# Patient Record
Sex: FEMALE | Race: BLACK OR AFRICAN AMERICAN | Employment: FULL TIME | URBAN - METROPOLITAN AREA
[De-identification: names, ages, dates, MRNs, and addresses within clinical notes are randomized per-mention and may not be internally consistent; named-entity substitution may affect disease eponyms.]

---

## 2018-10-16 ENCOUNTER — HOSPITAL ENCOUNTER (EMERGENCY)
Age: 33
Discharge: HOME | End: 2018-10-17
Attending: EMERGENCY MEDICINE
Payer: COMMERCIAL

## 2018-10-16 DIAGNOSIS — R20.0 NUMBNESS: Primary | ICD-10-CM

## 2018-10-16 DIAGNOSIS — G43.909 MIGRAINE WITHOUT STATUS MIGRAINOSUS, NOT INTRACTABLE, UNSPECIFIED MIGRAINE TYPE: ICD-10-CM

## 2018-10-16 DIAGNOSIS — H74.91 DISORDER OF RIGHT MASTOID: ICD-10-CM

## 2018-10-16 PROCEDURE — 93005 ELECTROCARDIOGRAM TRACING: CPT

## 2018-10-16 PROCEDURE — 85025 COMPLETE CBC W/AUTO DIFF WBC: CPT | Performed by: EMERGENCY MEDICINE

## 2018-10-16 PROCEDURE — 83735 ASSAY OF MAGNESIUM: CPT | Performed by: EMERGENCY MEDICINE

## 2018-10-16 PROCEDURE — 99285 EMERGENCY DEPT VISIT HI MDM: CPT

## 2018-10-16 PROCEDURE — 82550 ASSAY OF CK (CPK): CPT | Performed by: EMERGENCY MEDICINE

## 2018-10-16 PROCEDURE — 80048 BASIC METABOLIC PNL TOTAL CA: CPT | Performed by: EMERGENCY MEDICINE

## 2018-10-17 ENCOUNTER — APPOINTMENT (OUTPATIENT)
Dept: GENERAL RADIOLOGY | Age: 33
End: 2018-10-17
Attending: EMERGENCY MEDICINE
Payer: COMMERCIAL

## 2018-10-17 ENCOUNTER — APPOINTMENT (OUTPATIENT)
Dept: CT IMAGING | Age: 33
End: 2018-10-17
Attending: EMERGENCY MEDICINE
Payer: COMMERCIAL

## 2018-10-17 VITALS
HEART RATE: 79 BPM | DIASTOLIC BLOOD PRESSURE: 72 MMHG | BODY MASS INDEX: 43.23 KG/M2 | WEIGHT: 269 LBS | RESPIRATION RATE: 20 BRPM | HEIGHT: 66 IN | OXYGEN SATURATION: 98 % | SYSTOLIC BLOOD PRESSURE: 147 MMHG

## 2018-10-17 LAB
ANION GAP SERPL CALC-SCNC: 9 MMOL/L (ref 3–18)
BASOPHILS # BLD: 0 K/UL (ref 0–0.1)
BASOPHILS NFR BLD: 0 % (ref 0–3)
BUN SERPL-MCNC: 7 MG/DL (ref 7–18)
BUN/CREAT SERPL: 7 (ref 12–20)
CALCIUM SERPL-MCNC: 8.9 MG/DL (ref 8.5–10.1)
CHLORIDE SERPL-SCNC: 104 MMOL/L (ref 100–108)
CK MB CFR SERPL CALC: 0.2 % (ref 0–4)
CK MB SERPL-MCNC: 1.5 NG/ML (ref 5–25)
CK SERPL-CCNC: 680 U/L (ref 26–192)
CO2 SERPL-SCNC: 25 MMOL/L (ref 21–32)
CREAT SERPL-MCNC: 1.02 MG/DL (ref 0.6–1.3)
DIFFERENTIAL METHOD BLD: ABNORMAL
EOSINOPHIL # BLD: 0 K/UL (ref 0–0.4)
EOSINOPHIL NFR BLD: 0 % (ref 0–5)
ERYTHROCYTE [DISTWIDTH] IN BLOOD BY AUTOMATED COUNT: 12.1 % (ref 11.6–14.5)
GLUCOSE SERPL-MCNC: 97 MG/DL (ref 74–99)
HCT VFR BLD AUTO: 35.6 % (ref 35–45)
HGB BLD-MCNC: 12.2 G/DL (ref 12–16)
LYMPHOCYTES # BLD: 6.7 K/UL (ref 0.8–3.5)
LYMPHOCYTES NFR BLD: 45 % (ref 20–51)
MAGNESIUM SERPL-MCNC: 1.7 MG/DL (ref 1.6–2.6)
MCH RBC QN AUTO: 29 PG (ref 24–34)
MCHC RBC AUTO-ENTMCNC: 34.3 G/DL (ref 31–37)
MCV RBC AUTO: 84.8 FL (ref 74–97)
MONOCYTES # BLD: 1.6 K/UL (ref 0–1)
MONOCYTES NFR BLD: 11 % (ref 2–9)
NEUTS SEG # BLD: 6.5 K/UL (ref 1.8–8)
NEUTS SEG NFR BLD: 44 % (ref 42–75)
PLATELET # BLD AUTO: 371 K/UL (ref 135–420)
PMV BLD AUTO: 9 FL (ref 9.2–11.8)
POTASSIUM SERPL-SCNC: 3.3 MMOL/L (ref 3.5–5.5)
RBC # BLD AUTO: 4.2 M/UL (ref 4.2–5.3)
RBC MORPH BLD: ABNORMAL
SODIUM SERPL-SCNC: 138 MMOL/L (ref 136–145)
TROPONIN I SERPL-MCNC: <0.02 NG/ML (ref 0–0.06)
WBC # BLD AUTO: 14.8 K/UL (ref 4.6–13.2)
WBC MORPH BLD: ABNORMAL

## 2018-10-17 PROCEDURE — 70450 CT HEAD/BRAIN W/O DYE: CPT

## 2018-10-17 PROCEDURE — 72125 CT NECK SPINE W/O DYE: CPT

## 2018-10-17 PROCEDURE — 71046 X-RAY EXAM CHEST 2 VIEWS: CPT

## 2018-10-17 NOTE — ED TRIAGE NOTES
Presented to ED to be evaluated for reported 24 hour left foot numbness. Patient also reports numbness to left upper extremity with onset this p.m. Patient reports pain as documented. Patient states denies any additional complaints at time of triage. Patient is A&Ox4 with no s/s distress noted. Sepsis Screening completed (  )Patient meets SIRS criteria. ( x )Patient does not meet SIRS criteria. SIRS Criteria is achieved when two or more of the following are present ? Temperature < 96.8°F (36°C) or > 100.9°F (38.3°C) ? Heart Rate > 90 beats per minute ? Respiratory Rate > 20 breaths per minute ? WBC count > 12,000 or <4,000 or > 10% bands

## 2018-10-17 NOTE — ED NOTES
The documentation for this period is being entered following the guidelines as defined in the [address] [place] [address] downtime policy by Jhonny Morales RN.

## 2018-10-17 NOTE — ED PROVIDER NOTES
EMERGENCY DEPARTMENT HISTORY AND PHYSICAL EXAM 
 
Date: 10/16/2018 Patient Name: Karis Abbasi History of Presenting Illness Chief Complaint Patient presents with  Numbness History Provided By: Patient Chief Complaint: Left sided numbness Duration: 1 day Timing:  Constant Location: Left foot, left arm, left face Quality: Numb Severity: 7 out of 10 Associated Symptoms: Intermittent left arm pain Additional History (Context):  
12:33 AM 
Karis Abbasi is a 28 y.o. female who presents to the emergency department C/O left sided numbness beginning from her left foot up her leg into her left arm and left face (rated 7/10), onset yesterday. Associated sxs include intermittent left arm pain. Notes similar sxs in the past associated with dizziness. Pt is right hand dominant. Of note, pt has been seen by rheumatology to screen for autoimmune disease, last seen 1 year ago. Notes FHx of DM in mother, migraines in both parents, and HTN in multiple family members. Does not endorse tobacco, EtOH, or illicit drug use. Pt denies fever, visual changes, sinus pressure, rhinorrhea, aphasia, FHx of lupus or other autoimmune disorder, or any other sxs or complaints. PCP: Katelyn Hammonds MD 
 
 
 
Past History Past Medical History: 
History reviewed. No pertinent past medical history. Past Surgical History: 
Past Surgical History:  
Procedure Laterality Date  HX CHOLECYSTECTOMY    
 2009 Family History: 
History reviewed. No pertinent family history. Social History: 
Social History Tobacco Use  Smoking status: Never Smoker  Smokeless tobacco: Never Used Substance Use Topics  Alcohol use: No  
 Drug use: No  
 
 
Allergies: Allergies Allergen Reactions  Amoxicillin Rash Review of Systems Review of Systems Constitutional: Negative for chills, diaphoresis, fever and unexpected weight change. HENT: Negative for congestion, drooling, ear pain, rhinorrhea, sore throat, tinnitus and trouble swallowing. Eyes: Negative for photophobia, pain, redness and visual disturbance. Respiratory: Negative for cough, choking, chest tightness, shortness of breath, wheezing and stridor. Cardiovascular: Negative for chest pain, palpitations and leg swelling. Gastrointestinal: Negative for abdominal distention, abdominal pain, anal bleeding, blood in stool, constipation, diarrhea, nausea and vomiting. Genitourinary: Negative for difficulty urinating, dysuria, flank pain, frequency, hematuria and urgency. Musculoskeletal: Positive for myalgias (intermittent left arm pain). Negative for arthralgias, back pain and neck pain. Skin: Negative for color change, rash and wound. Neurological: Positive for numbness (Left foot, left arm, left face). Negative for dizziness, seizures, syncope, speech difficulty, light-headedness and headaches. Hematological: Does not bruise/bleed easily. Psychiatric/Behavioral: Negative for agitation, behavioral problems, hallucinations, self-injury and suicidal ideas. The patient is not hyperactive. Physical Exam  
 
Vitals:  
 10/17/18 0300 10/17/18 0330 10/17/18 0400 10/17/18 0430 BP: 151/75 148/69 157/78 147/72 Pulse: 82 80 92 79 Resp: 16 22 23 20 SpO2: 99% 99% 99% 98% Weight:      
Height:      
 
Physical Exam  
Constitutional: She is oriented to person, place, and time. She appears well-developed and well-nourished. No distress. HENT:  
Head: Normocephalic and atraumatic. Right Ear: External ear normal.  
Left Ear: External ear normal.  
Mouth/Throat: Oropharynx is clear and moist. No oropharyngeal exudate. Eyes: Conjunctivae and EOM are normal. Pupils are equal, round, and reactive to light. No scleral icterus. No pallor Neck: Normal range of motion. Neck supple. No JVD present. No tracheal deviation present. No thyromegaly present. Cardiovascular: Normal rate, regular rhythm and normal heart sounds. Pulmonary/Chest: Effort normal and breath sounds normal. No stridor. No respiratory distress. Abdominal: Soft. Bowel sounds are normal. She exhibits no distension. There is no tenderness. There is no rebound and no guarding. Musculoskeletal: Normal range of motion. She exhibits no edema or tenderness. No soft tissue injuries Lymphadenopathy:  
  She has no cervical adenopathy. Neurological: She is alert and oriented to person, place, and time. She has normal strength and normal reflexes. She displays no atrophy and no tremor. No cranial nerve deficit or sensory deficit. She exhibits normal muscle tone. She displays no seizure activity. Coordination and gait normal. GCS eye subscore is 4. GCS verbal subscore is 5. GCS motor subscore is 6. Reflex Scores: 
     Brachioradialis reflexes are 2+ on the right side and 2+ on the left side. Patellar reflexes are 2+ on the right side and 2+ on the left side. Achilles reflexes are 2+ on the right side and 2+ on the left side. radial median and ulnar nerve function is normal, normal FTN function. No drift, normal cognition and normal fine motor coordination Skin: Skin is warm and dry. No rash noted. She is not diaphoretic. No erythema. Psychiatric: She has a normal mood and affect. Her behavior is normal. Judgment and thought content normal. Her speech is not delayed and not slurred. Cognition and memory are not impaired. She exhibits normal recent memory and normal remote memory. Nursing note and vitals reviewed. Diagnostic Study Results Labs - No results found for this or any previous visit (from the past 12 hour(s)). Radiologic Studies -   
CT Results  (Last 48 hours) None CXR Results  (Last 48 hours) None Medical Decision Making I am the first provider for this patient. I reviewed the vital signs, available nursing notes, past medical history, past surgical history, family history and social history. Vital Signs-Reviewed the patient's vital signs. Pulse Oximetry Analysis - 100% on RA Cardiac Monitor: 
Rate: 83 bpm 
Rhythm: NSR 
 
EKG interpretation: (Preliminary) 11:13 PM 
NSR at 87 bpm. Normal ST segments. EKG read by Dianne Cabello. Abril Zuniga MD at 11:17 PM  
 
Records Reviewed: Nursing Notes and Old Medical Records Provider Notes (Medical Decision Making):  
Ddx: Unlikely to be CVA, tumor, or bleed, but possible. Will scan including C-spine for neurologic compromises, otherwise migraine. Procedures: 
Procedures MEDICATIONS GIVEN: 
Medications - No data to display ED Course: The documentation for this period is being entered following the guidelines as defined in the [facility] policy by Rusty Bryant and Dianne Cabello. Abril Zuniga MD. 
 
12:33 AM  
Initial assessment performed. The patients presenting problems have been discussed, and they are in agreement with the care plan formulated and outlined with them. I have encouraged them to ask questions as they arise throughout their visit. Diagnosis and Disposition DISCHARGE NOTE: 
4:10 AM 
Bobby Pantoja  results have been reviewed with her. She has been counseled regarding her diagnosis, treatment, and plan. She verbally conveys understanding and agreement of the signs, symptoms, diagnosis, treatment and prognosis and additionally agrees to follow up as discussed. She also agrees with the care-plan and conveys that all of her questions have been answered. I have also provided discharge instructions for her that include: educational information regarding their diagnosis and treatment, and list of reasons why they would want to return to the ED prior to their follow-up appointment, should her condition change. She has been provided with education for proper emergency department utilization. CLINICAL IMPRESSION: 
 
1. Numbness 2. Migraine without status migrainosus, not intractable, unspecified migraine type 3. Disorder of right mastoid Downtime instructions given. PLAN: 
1. D/C Home 2. There are no discharge medications for this patient. 3.  
Follow-up Information Follow up With Specialties Details Why Contact Info Stefania Thompson MD Family Practice Schedule an appointment as soon as possible for a visit in 2 days For primary care follow up [id number] [facility] 
[phone] [facility] EMERGENCY DEPT Emergency Medicine  As needed, If symptoms worsen [address] [place] [id number] 
[phone]  
  
 
_______________________________ Attestations: This note is prepared by Adams Reid, acting as Scribe for Cliff Vasquez. Harmony Byers MD. Cliff Vasquez. Harmony Byers MD:  The scribe's documentation has been prepared under my direction and personally reviewed by me in its entirety. I confirm that the note above accurately reflects all work, treatment, procedures, and medical decision making performed by me. 
_______________________________

## 2018-10-17 NOTE — ED NOTES
Patient given copy of dc instructions and 1 script(s) for Fioricet. Patient verbalized understanding of instructions and script (s). Patient given a current medication reconciliation form and verbalized understanding of their medications. Patient verbalized understanding of the importance of discussing medications with  his or her physician or clinic they will be following up with. Patient alert and oriented and in no acute distress. Patient discharged home ambulatory with self.

## 2018-11-25 ENCOUNTER — APPOINTMENT (OUTPATIENT)
Dept: ULTRASOUND IMAGING | Age: 33
End: 2018-11-25
Attending: EMERGENCY MEDICINE
Payer: COMMERCIAL

## 2018-11-25 ENCOUNTER — HOSPITAL ENCOUNTER (EMERGENCY)
Age: 33
Discharge: HOME | End: 2018-11-25
Attending: EMERGENCY MEDICINE | Admitting: EMERGENCY MEDICINE
Payer: COMMERCIAL

## 2018-11-25 VITALS
HEIGHT: 66 IN | RESPIRATION RATE: 14 BRPM | HEART RATE: 84 BPM | WEIGHT: 271.17 LBS | DIASTOLIC BLOOD PRESSURE: 74 MMHG | BODY MASS INDEX: 43.58 KG/M2 | OXYGEN SATURATION: 100 % | SYSTOLIC BLOOD PRESSURE: 133 MMHG | TEMPERATURE: 98.1 F

## 2018-11-25 DIAGNOSIS — R10.2 PELVIC PAIN AFFECTING PREGNANCY IN FIRST TRIMESTER, ANTEPARTUM: Primary | ICD-10-CM

## 2018-11-25 DIAGNOSIS — O26.891 PELVIC PAIN AFFECTING PREGNANCY IN FIRST TRIMESTER, ANTEPARTUM: Primary | ICD-10-CM

## 2018-11-25 DIAGNOSIS — Z3A.01 6 WEEKS GESTATION OF PREGNANCY: ICD-10-CM

## 2018-11-25 LAB
ABO + RH BLD: NORMAL
ALBUMIN SERPL-MCNC: 3.6 G/DL (ref 3.4–5)
ALBUMIN/GLOB SERPL: 0.7 {RATIO} (ref 0.8–1.7)
ALP SERPL-CCNC: 68 U/L (ref 45–117)
ALT SERPL-CCNC: 21 U/L (ref 13–56)
ANION GAP SERPL CALC-SCNC: 10 MMOL/L (ref 3–18)
APPEARANCE UR: CLEAR
AST SERPL-CCNC: 20 U/L (ref 15–37)
BASOPHILS # BLD: 0.1 K/UL (ref 0–0.1)
BASOPHILS NFR BLD: 1 % (ref 0–3)
BILIRUB SERPL-MCNC: 0.4 MG/DL (ref 0.2–1)
BILIRUB UR QL: NEGATIVE
BUN SERPL-MCNC: 7 MG/DL (ref 7–18)
BUN/CREAT SERPL: 8
CALCIUM SERPL-MCNC: 9.2 MG/DL (ref 8.5–10.1)
CHLORIDE SERPL-SCNC: 101 MMOL/L (ref 100–108)
CO2 SERPL-SCNC: 25 MMOL/L (ref 21–32)
COLOR UR: YELLOW
CREAT SERPL-MCNC: 0.91 MG/DL (ref 0.6–1.3)
DIFFERENTIAL METHOD BLD: ABNORMAL
EOSINOPHIL # BLD: 0.1 K/UL (ref 0–0.4)
EOSINOPHIL NFR BLD: 1 % (ref 0–5)
ERYTHROCYTE [DISTWIDTH] IN BLOOD BY AUTOMATED COUNT: 12.4 % (ref 11.6–14.5)
GLOBULIN SER CALC-MCNC: 4.9 G/DL (ref 2–4)
GLUCOSE SERPL-MCNC: 78 MG/DL (ref 74–99)
GLUCOSE UR STRIP.AUTO-MCNC: NEGATIVE MG/DL
HCG SERPL-ACNC: 8966 MIU/ML (ref 0–10)
HCG UR QL: POSITIVE
HCT VFR BLD AUTO: 37.7 % (ref 35–45)
HGB BLD-MCNC: 13.1 G/DL (ref 12–16)
HGB UR QL STRIP: NEGATIVE
KETONES UR QL STRIP.AUTO: NEGATIVE MG/DL
LEUKOCYTE ESTERASE UR QL STRIP.AUTO: NEGATIVE
LIPASE SERPL-CCNC: 185 U/L (ref 73–393)
LYMPHOCYTES # BLD: 5 K/UL (ref 0.8–3.5)
LYMPHOCYTES NFR BLD: 42 % (ref 20–51)
MCH RBC QN AUTO: 29.1 PG (ref 24–34)
MCHC RBC AUTO-ENTMCNC: 34.7 G/DL (ref 31–37)
MCV RBC AUTO: 83.8 FL (ref 74–97)
MONOCYTES # BLD: 1.2 K/UL (ref 0–1)
MONOCYTES NFR BLD: 10 % (ref 2–9)
NEUTS SEG # BLD: 5.5 K/UL (ref 1.8–8)
NEUTS SEG NFR BLD: 46 % (ref 42–75)
NITRITE UR QL STRIP.AUTO: NEGATIVE
PH UR STRIP: 5.5 [PH] (ref 5–8)
PLATELET # BLD AUTO: 351 K/UL (ref 135–420)
PMV BLD AUTO: 8.4 FL (ref 9.2–11.8)
POTASSIUM SERPL-SCNC: 3.7 MMOL/L (ref 3.5–5.5)
PROT SERPL-MCNC: 8.5 G/DL (ref 6.4–8.2)
PROT UR STRIP-MCNC: NEGATIVE MG/DL
RBC # BLD AUTO: 4.5 M/UL (ref 4.2–5.3)
RBC MORPH BLD: ABNORMAL
SERVICE CMNT-IMP: NORMAL
SODIUM SERPL-SCNC: 136 MMOL/L (ref 136–145)
SP GR UR REFRACTOMETRY: 1.01 (ref 1–1.03)
UROBILINOGEN UR QL STRIP.AUTO: 0.2 EU/DL (ref 0.2–1)
WBC # BLD AUTO: 11.9 K/UL (ref 4.6–13.2)
WBC MORPH BLD: ABNORMAL
WET PREP GENITAL: NORMAL

## 2018-11-25 PROCEDURE — 99285 EMERGENCY DEPT VISIT HI MDM: CPT

## 2018-11-25 PROCEDURE — 84702 CHORIONIC GONADOTROPIN TEST: CPT

## 2018-11-25 PROCEDURE — 87491 CHLMYD TRACH DNA AMP PROBE: CPT

## 2018-11-25 PROCEDURE — 80053 COMPREHEN METABOLIC PANEL: CPT

## 2018-11-25 PROCEDURE — 86900 BLOOD TYPING SEROLOGIC ABO: CPT

## 2018-11-25 PROCEDURE — 83690 ASSAY OF LIPASE: CPT

## 2018-11-25 PROCEDURE — 85025 COMPLETE CBC W/AUTO DIFF WBC: CPT

## 2018-11-25 PROCEDURE — 93976 VASCULAR STUDY: CPT

## 2018-11-25 PROCEDURE — 81025 URINE PREGNANCY TEST: CPT

## 2018-11-25 PROCEDURE — 87210 SMEAR WET MOUNT SALINE/INK: CPT

## 2018-11-25 PROCEDURE — 81003 URINALYSIS AUTO W/O SCOPE: CPT

## 2018-11-25 RX ORDER — ACETAMINOPHEN 325 MG/1
650 TABLET ORAL
Status: DISCONTINUED | OUTPATIENT
Start: 2018-11-25 | End: 2018-11-25

## 2018-11-25 NOTE — DISCHARGE INSTRUCTIONS
Pelvic Pain: Care Instructions  Your Care Instructions    Pelvic pain, or pain in the lower belly, can have many causes. Often pelvic pain is not serious and gets better in a few days. If your pain continues or gets worse, you may need tests and treatment. Tell your doctor about any new symptoms. These may be signs of a serious problem. Follow-up care is a key part of your treatment and safety. Be sure to make and go to all appointments, and call your doctor if you are having problems. It's also a good idea to know your test results and keep a list of the medicines you take. How can you care for yourself at home? · Rest until you feel better. Lie down, and raise your legs by placing a pillow under your knees. · Drink plenty of fluids. You may find that small, frequent sips are easier on your stomach than if you drink a lot at once. Avoid drinks with carbonation or caffeine, such as soda pop, tea, or coffee. · Try eating several small meals instead of 2 or 3 large ones. Eat mild foods, such as rice, dry toast or crackers, bananas, and applesauce. Avoid fatty and spicy foods, other fruits, and alcohol until 48 hours after your symptoms have gone away. · Take an over-the-counter pain medicine, such as acetaminophen (Tylenol), ibuprofen (Advil, Motrin), or naproxen (Aleve). Read and follow all instructions on the label. · Do not take two or more pain medicines at the same time unless the doctor told you to. Many pain medicines have acetaminophen, which is Tylenol. Too much acetaminophen (Tylenol) can be harmful. · You can put a heating pad, a warm cloth, or moist heat on your belly to relieve pain. When should you call for help?   Call your doctor now or seek immediate medical care if:    · You have a new or higher fever.     · You have unusual vaginal bleeding.     · You have new or worse belly or pelvic pain.     · You have vaginal discharge that has increased in amount or smells bad.    Watch closely for changes in your health, and be sure to contact your doctor if:    · You do not get better as expected. Where can you learn more? Go to http://milan-germain.info/. Enter [id number] in the search box to learn more about \"Pelvic Pain: Care Instructions. \"  Current as of: May 15, 2018  Content Version: 11.8  © 1281-3042 [employer]. Care instructions adapted under license by [employer] (which disclaims liability or warranty for this information). If you have questions about a medical condition or this instruction, always ask your healthcare professional. [address] any warranty or liability for your use of this information. Weeks 6 to 10 of Your Pregnancy: Care Instructions  Your Care Instructions    Congratulations on your pregnancy. This is an exciting and important time for you. During the first 6 to 10 weeks of your pregnancy, your body goes through many changes. Your baby grows very fast, even though you cannot feel it yet. You may start to notice that you feel different, both in your body and your emotions. Because each woman's pregnancy is unique, there is no right way to feel. You may feel the healthiest you have ever been, or you may feel tired or sick to your stomach (\"morning sickness\"). These early weeks are a time to make healthy choices and to eat the best foods for you and your baby. This care sheet will give you some ideas. This is also a good time to think about birth defects testing. These are tests done during pregnancy to look for possible problems with the baby. First trimester tests for birth defects can be done between 8 and 17 weeks of pregnancy, depending on the test. Talk with your doctor about what kinds of tests are available. Follow-up care is a key part of your treatment and safety. Be sure to make and go to all appointments, and call your doctor if you are having problems.  It's also a good idea to know your test results and keep a list of the medicines you take. How can you care for yourself at home? Eat well  · Eat at least 3 meals and 2 healthy snacks every day. Eat fresh, whole foods, including:  ? 7 or more servings of bread, tortillas, cereal, rice, pasta, or oatmeal.  ? 3 or more servings of vegetables, especially leafy green vegetables. ? 2 or more servings of fruits. ? 3 or more servings of milk, yogurt, or cheese. ? 2 or more servings of meat, turkey, chicken, fish, eggs, or dried beans. · Drink plenty of fluids, especially water. Avoid sodas and other sweetened drinks. · Choose foods that have important vitamins for your baby, such as calcium, iron, and folate. ? Dairy products, tofu, canned fish with bones, almonds, broccoli, dark leafy greens, corn tortillas, and fortified orange juice are good sources of calcium. ? Beef, poultry, liver, spinach, lentils, dried beans, fortified cereals, and dried fruits are rich in iron. ? Dark leafy greens, broccoli, asparagus, liver, fortified cereals, orange juice, peanuts, and almonds are good sources of folate. · Avoid foods that could harm your baby. ? Do not eat raw or undercooked meat, chicken, or fish (such as sushi or raw oysters). ? Do not eat raw eggs or foods that contain raw eggs, such as Caesar dressing. ? Do not eat soft cheeses and unpasteurized dairy foods, such as Brie, feta, or blue cheese. ? Do not eat fish that contains a lot of mercury, such as shark, swordfish, tilefish, or gardenia mackerel. Do not eat more than 6 ounces of tuna each week. ? Do not eat raw sprouts, especially alfalfa sprouts. ? Cut down on caffeine, such as coffee, tea, and cola. Protect yourself and your baby  · Do not touch chetan litter or cat feces. They can cause an infection that could harm your baby. · High body temperature can be harmful to your baby. So if you want to use a sauna or hot tub, be sure to talk to your doctor about how to use it safely.   [place] with morning sickness  · Sip small amounts of water, juices, or shakes. Try drinking between meals, not with meals. · Eat 5 or 6 small meals a day. Try dry toast or crackers when you first get up, and eat breakfast a little later. · Avoid spicy, greasy, and fatty foods. · When you feel sick, open your windows or go for a short walk to get fresh air. · Try nausea wristbands. These help some women. · Tell your doctor if you think your prenatal vitamins make you sick. Where can you learn more? Go to http://milan-germain.info/. Enter G112 in the search box to learn more about \"Weeks 6 to 10 of Your Pregnancy: Care Instructions. \"  Current as of: November 21, 2017  Content Version: 11.8  © 1836-9271 [employer]. Care instructions adapted under license by [employer] (which disclaims liability or warranty for this information). If you have questions about a medical condition or this instruction, always ask your healthcare professional. [address] any warranty or liability for your use of this information.

## 2018-11-25 NOTE — ED PROVIDER NOTES
EMERGENCY DEPARTMENT HISTORY AND PHYSICAL EXAM 
 
Date: 11/25/2018 Patient Name: Manda Baptiste History of Presenting Illness Chief Complaint Patient presents with  Abdominal Pain History Provided By: Patient Chief Complaint: Pelvic pain Duration: 2-3 Days Timing:  Acute and Worsening Location: Pelvis Quality: Cramping Severity: 5 out of 10 Associated Symptoms: nausea Additional History (Context):  
8:21 AM  
Manda Baptiste is a 28 y.o. female with no PMHx who presents to the emergency department C/O cramping pelvic pain (5/10) worse on the right onset 2-3 days ago that worsened last night. Associated sxs include nausea. Pt reports one positive at home pregnancy test last night. LNMP was October 17th. Reports allergy to Amoxicillin. Pt denies vomiting, dysuria, back pain, vaginal discharge, vaginal bleeding, and any other sxs or complaints. PCP: Yong, MD Norman 
 
 
 
Past History Past Medical History: 
History reviewed. No pertinent past medical history. Past Surgical History: 
Past Surgical History:  
Procedure Laterality Date  HX CHOLECYSTECTOMY    
 2009 Family History: 
History reviewed. No pertinent family history. Social History: 
Social History Tobacco Use  Smoking status: Never Smoker  Smokeless tobacco: Never Used Substance Use Topics  Alcohol use: No  
 Drug use: No  
 
 
Allergies: Allergies Allergen Reactions  Amoxicillin Rash Review of Systems Review of Systems Gastrointestinal: Positive for nausea. Negative for vomiting. Genitourinary: Positive for pelvic pain. Negative for dysuria, vaginal bleeding and vaginal discharge. Musculoskeletal: Negative for back pain. All other systems reviewed and are negative. Physical Exam  
 
Vitals:  
 11/25/18 0810 11/25/18 1108 BP: 161/78 133/74 Pulse: 84 Resp: 14 14 Temp: 98.1 °F (36.7 °C) SpO2: 100% 100% Weight: 123 kg (271 lb 2.7 oz) Height: 5' 6\" (1.676 m) Physical Exam  
Nursing note and vitals reviewed. Vital signs and nursing notes reviewed CONSTITUTIONAL: Alert, in no apparent distress; well-developed; well-nourished. HEAD:  Normocephalic, atraumatic EYES: PERRL; EOM's intact. ENTM: Nose: no rhinorrhea; Throat: no erythema or exudate, mucous membranes moist 
Neck:  No JVD, supple without lymphadenopathy RESP: Chest clear, equal breath sounds. CV: S1 and S2 WNL; No murmurs, gallops or rubs. GI: Normal bowel sounds, abdomen soft and non-tender. No masses or organomegaly. : No costo-vertebral angle tenderness. Mild right sided pelvic pain. BACK:  Non-tender UPPER EXT:  Normal inspection LOWER EXT: No edema, no calf tenderness. Distal pulses intact. NEURO: CN intact, reflexes 2/4 and sym, strength 5/5 and sym, sensation intact. SKIN: No rashes; Normal for age and stage. PSYCH:  Alert and oriented, normal affect. Diagnostic Study Results Labs - Recent Results (from the past 12 hour(s)) URINALYSIS W/ RFLX MICROSCOPIC Collection Time: 11/25/18  8:15 AM  
Result Value Ref Range Color YELLOW Appearance CLEAR Specific gravity 1.011 1.005 - 1.030    
 pH (UA) 5.5 5.0 - 8.0 Protein NEGATIVE  NEG mg/dL Glucose NEGATIVE  NEG mg/dL Ketone NEGATIVE  NEG mg/dL Bilirubin NEGATIVE  NEG Blood NEGATIVE  NEG Urobilinogen 0.2 0.2 - 1.0 EU/dL Nitrites NEGATIVE  NEG Leukocyte Esterase NEGATIVE  NEG    
HCG URINE, QL. - POC Collection Time: 11/25/18  8:17 AM  
Result Value Ref Range Pregnancy test,urine (POC) POSITIVE (A) NEG    
BLOOD TYPE, (ABO+RH) Collection Time: 11/25/18  8:30 AM  
Result Value Ref Range ABO/Rh(D) O POSITIVE   
CBC WITH AUTOMATED DIFF Collection Time: 11/25/18  8:40 AM  
Result Value Ref Range WBC 11.9 4.6 - 13.2 K/uL  
 RBC 4.50 4. 20 - 5.30 M/uL  
 HGB 13.1 12.0 - 16.0 g/dL HCT 37.7 35.0 - 45.0 %  MCV 83.8 74.0 - 97.0 FL  
 MCH 29.1 24.0 - 34.0 PG  
 MCHC 34.7 31.0 - 37.0 g/dL  
 RDW 12.4 11.6 - 14.5 % PLATELET 067 006 - 595 K/uL MPV 8.4 (L) 9.2 - 11.8 FL  
 NEUTROPHILS 46 42 - 75 % LYMPHOCYTES 42 20 - 51 % MONOCYTES 10 (H) 2 - 9 % EOSINOPHILS 1 0 - 5 % BASOPHILS 1 0 - 3 %  
 ABS. NEUTROPHILS 5.5 1.8 - 8.0 K/UL  
 ABS. LYMPHOCYTES 5.0 (H) 0.8 - 3.5 K/UL  
 ABS. MONOCYTES 1.2 (H) 0 - 1.0 K/UL  
 ABS. EOSINOPHILS 0.1 0.0 - 0.4 K/UL  
 ABS. BASOPHILS 0.1 0.0 - 0.1 K/UL  
 RBC COMMENTS TARGET CELLS 
OCCASIONAL 
    
 WBC COMMENTS ATYPICAL LYMPHOCYTES PRESENT    
 DF MANUAL    
BETA HCG, QT Collection Time: 11/25/18  8:40 AM  
Result Value Ref Range Beta HCG, QT 8,966 (H) 0 - 10 MIU/ML  
METABOLIC PANEL, COMPREHENSIVE Collection Time: 11/25/18  8:40 AM  
Result Value Ref Range Sodium 136 136 - 145 mmol/L Potassium 3.7 3.5 - 5.5 mmol/L Chloride 101 100 - 108 mmol/L  
 CO2 25 21 - 32 mmol/L Anion gap 10 3.0 - 18 mmol/L Glucose 78 74 - 99 mg/dL BUN 7 7.0 - 18 MG/DL Creatinine 0.91 0.6 - 1.3 MG/DL  
 BUN/Creatinine ratio 8    
 GFR est AA >60 >60 ml/min/1.73m2 GFR est non-AA >60 >60 ml/min/1.73m2 Calcium 9.2 8.5 - 10.1 MG/DL Bilirubin, total 0.4 0.2 - 1.0 MG/DL  
 ALT (SGPT) 21 13 - 56 U/L  
 AST (SGOT) 20 15 - 37 U/L Alk. phosphatase 68 45 - 117 U/L Protein, total 8.5 (H) 6.4 - 8.2 g/dL Albumin 3.6 3.4 - 5.0 g/dL Globulin 4.9 (H) 2.0 - 4.0 g/dL A-G Ratio 0.7 (L) 0.8 - 1.7 LIPASE Collection Time: 11/25/18  8:40 AM  
Result Value Ref Range Lipase 185 73 - 393 U/L  
WET PREP Collection Time: 11/25/18  8:55 AM  
Result Value Ref Range Special Requests: NO SPECIAL REQUESTS Wet prep NO YEAST,TRICHOMONAS OR CLUE CELLS NOTED Radiologic Studies -  
US UTS TRANSVAGINAL OB Final Result IMPRESSION: 
 
1. Ugalde intrauterine pregnancy dating to 6 weeks and 1 day with detectable fetal heart rate. Small focus of subchorionic hemorrhage. No evidence for 
ectopic pregnancy.  Recommend continued short interval followup beta hCG and 
pelvic ultrasound to resolution. 2. Asymmetrically decreased vascular flow to the right ovary with only 
intermittent high resistance arterial flow and absent venous waveforms. Indeterminate to what extent this represents technical limitations given 
adjacent bowel gas and patient's body habitus versus high-grade right ovarian 
vascular compromise with at least intermittent right ovarian torsion. Findings concerning for potential right ovarian torsion were discussed with the 
ordering ER physician, Dr. Eric Ford, at 11:05 AM on 11/25/2018. As read by the radiologist.  
 
CT Results  (Last 48 hours) None CXR Results  (Last 48 hours) None Medications given in the ED- Medications - No data to display Medical Decision Making I am the first provider for this patient. I reviewed the vital signs, available nursing notes, past medical history, past surgical history, family history and social history. Vital Signs-Reviewed the patient's vital signs. Pulse Oximetry Analysis - 100% on RA Records Reviewed: Nursing Notes and Old Medical Records Provider Notes (Medical Decision Making): DDX: pregnancy, ectopic pregnancy, ovarian torsion Procedures: 
Pelvic Exam 
Date/Time: 11/25/2018 8:54 AM 
Performed by: attending Exam assisted by:  Sarika Menard RN. Type of exam performed: bimanual and speculum. External genitalia appearance: normal.   
Vaginal exam:  normal.   
Cervical exam:  normal, os closed and no cervical motion tenderness. Specimen(s) collected:  chlamydia, GC and vaginal culture. Bimanual exam:  right adenexal tenderness (mild). Patient tolerance: Patient tolerated the procedure well with no immediate complications ED Course: 8:21 AM Initial assessment performed. The patients presenting problems have been discussed, and they are in agreement with the care plan formulated and outlined with them. I have encouraged them to ask questions as they arise throughout their visit. 11:05 AM Radiology called reporting poor flow to the right ovary. Will consult OB/GYN.  
 
11:10 AM Updated pt on labs results. She not not have an OBV/GYN that she follows regularly. 11:34 AM Discussed patient's history, exam, and available diagnostics results with Harmeet Parrish. Nicholas Randolph MD, OB/GYN, who reviewed US results. He notes that pain and decreased flow is on the right but he pt's corpus luteum cyst is on the left. This makes it very unlikely that there is torsion, especially given that the pain has been present for 2-3 days. Diagnosis and Disposition Discussion: 
28 y.o female presents pregnant with right pelvic pain. US shows slightly diminished flow on the right and corpus luteum cyst on the left. Dicussed with OB/GYN who states pt is low risk for torsion given cyst is on the left. Discussed results with patient and advised to avoid NSAID's. Pt understand, agrees and will follow up with Harmeet Parrish. Nicholas Randolph MD. 
 
DISCHARGE NOTE: 
11:45 AM 
Stormy Yazan  results have been reviewed with her. She has been counseled regarding her diagnosis, treatment, and plan. She verbally conveys understanding and agreement of the signs, symptoms, diagnosis, treatment and prognosis and additionally agrees to follow up as discussed. She also agrees with the care-plan and conveys that all of her questions have been answered. I have also provided discharge instructions for her that include: educational information regarding their diagnosis and treatment, and list of reasons why they would want to return to the ED prior to their follow-up appointment, should her condition change. She has been provided with education for proper emergency department utilization. CLINICAL IMPRESSION: 
 
1. Pelvic pain affecting pregnancy in first trimester, antepartum 2. 6 weeks gestation of pregnancy PLAN: 
1. D/C Home 2. There are no discharge medications for this patient. 3.  
Follow-up Information Follow up With Specialties Details Why Contact Info Donato Quintana MD Obstetrics & Gynecology, Gynecology, Obstetrics Schedule an appointment as soon as possible for a visit in 3 days For OB/GYN follow up. [address] [address] 
[phone] [facility] EMERGENCY DEPT Emergency Medicine Go to As needed, If symptoms worsen [address] [address] 
[phone]  
  
 
_______________________________ Attestations: This note is prepared by Michelle Avila, acting as Scribe for Jorge Luis Nieves MD. 
 
Jorge Luis Nieves MD:  The scribe's documentation has been prepared under my direction and personally reviewed by me in its entirety. I confirm that the note above accurately reflects all work, treatment, procedures, and medical decision making performed by me. 
_______________________________

## 2018-11-25 NOTE — ED TRIAGE NOTES
Patient reports she worked all night and his having some cramping. She took a pregnancy test last night and it was positive.

## 2018-11-25 NOTE — ED NOTES
In room to meet patient and assume care. Testing completed. MD in room to discuss finding of possible ovarian torsion. OBGYN being consulted. Patient resting supine in bed with continuous monitoring in place. Side rails up with call bell in reach. Patient advised of plan of care.

## 2018-11-25 NOTE — ED NOTES
MD in room discussing results of consult. Patient education performed. Patient will follow up with OBGYN tomorrow and return to ED as needed.

## 2018-11-26 LAB
C TRACH RRNA SPEC QL NAA+PROBE: NEGATIVE
N GONORRHOEA RRNA SPEC QL NAA+PROBE: NEGATIVE
SPECIMEN SOURCE: NORMAL

## 2019-01-15 LAB
ATRIAL RATE: 87 BPM
CALCULATED P AXIS, ECG09: 57 DEGREES
CALCULATED R AXIS, ECG10: 51 DEGREES
CALCULATED T AXIS, ECG11: 18 DEGREES
DIAGNOSIS, 93000: NORMAL
P-R INTERVAL, ECG05: 170 MS
Q-T INTERVAL, ECG07: 356 MS
QRS DURATION, ECG06: 80 MS
QTC CALCULATION (BEZET), ECG08: 428 MS
VENTRICULAR RATE, ECG03: 87 BPM

## 2019-04-24 ENCOUNTER — HOSPITAL ENCOUNTER (OUTPATIENT)
Age: 34
Discharge: HOME | End: 2019-04-26
Attending: OBSTETRICS & GYNECOLOGY | Admitting: OBSTETRICS & GYNECOLOGY
Payer: COMMERCIAL

## 2019-04-24 PROBLEM — Z33.1 IUP (INTRAUTERINE PREGNANCY), INCIDENTAL: Status: ACTIVE | Noted: 2019-04-24

## 2019-04-24 LAB
APPEARANCE UR: CLEAR
BILIRUB UR QL: NEGATIVE
COLOR UR: YELLOW
GLUCOSE UR QL STRIP.AUTO: NEGATIVE MG/DL
KETONES UR-MCNC: NEGATIVE MG/DL
LEUKOCYTE ESTERASE UR QL STRIP: NEGATIVE
NITRITE UR QL: NEGATIVE
PH UR: 6.5 [PH] (ref 5–9)
PROT UR QL: NEGATIVE MG/DL
RBC # UR STRIP: NEGATIVE /UL
SERVICE CMNT-IMP: NORMAL
SP GR UR: 1.01 (ref 1–1.02)
UROBILINOGEN UR QL: 0.2 EU/DL (ref 0.2–1)

## 2019-04-24 PROCEDURE — 84156 ASSAY OF PROTEIN URINE: CPT

## 2019-04-24 PROCEDURE — 82731 ASSAY OF FETAL FIBRONECTIN: CPT

## 2019-04-24 PROCEDURE — 81003 URINALYSIS AUTO W/O SCOPE: CPT

## 2019-04-24 PROCEDURE — 87491 CHLMYD TRACH DNA AMP PROBE: CPT

## 2019-04-24 PROCEDURE — 87086 URINE CULTURE/COLONY COUNT: CPT

## 2019-04-24 RX ORDER — CHOLECALCIFEROL (VITAMIN D3) 125 MCG
2000 CAPSULE ORAL DAILY
COMMUNITY

## 2019-04-25 PROBLEM — Z33.1 IUP (INTRAUTERINE PREGNANCY), INCIDENTAL: Status: RESOLVED | Noted: 2019-04-24 | Resolved: 2019-04-25

## 2019-04-25 PROBLEM — O47.02: Status: ACTIVE | Noted: 2019-04-25

## 2019-04-25 PROBLEM — O16.2 ELEVATED BLOOD PRESSURE AFFECTING PREGNANCY IN SECOND TRIMESTER, ANTEPARTUM: Status: ACTIVE | Noted: 2019-04-25

## 2019-04-25 PROBLEM — Z3A.27 27 WEEKS GESTATION OF PREGNANCY: Status: ACTIVE | Noted: 2019-04-25

## 2019-04-25 LAB
ABO + RH BLD: NORMAL
ALBUMIN SERPL-MCNC: 2.8 G/DL (ref 3.4–5)
ALBUMIN/GLOB SERPL: 0.7 {RATIO} (ref 0.8–1.7)
ALP SERPL-CCNC: 62 U/L (ref 45–117)
ALT SERPL-CCNC: 19 U/L (ref 13–56)
ANION GAP SERPL CALC-SCNC: 11 MMOL/L (ref 3–18)
AST SERPL-CCNC: 22 U/L (ref 15–37)
BILIRUB SERPL-MCNC: 0.3 MG/DL (ref 0.2–1)
BLOOD GROUP ANTIBODIES SERPL: NORMAL
BUN SERPL-MCNC: 5 MG/DL (ref 7–18)
BUN/CREAT SERPL: 8 (ref 12–20)
C TRACH RRNA SPEC QL NAA+PROBE: NEGATIVE
CALCIUM SERPL-MCNC: 8.5 MG/DL (ref 8.5–10.1)
CHLORIDE SERPL-SCNC: 107 MMOL/L (ref 100–108)
CO2 SERPL-SCNC: 22 MMOL/L (ref 21–32)
CREAT SERPL-MCNC: 0.61 MG/DL (ref 0.6–1.3)
CREAT UR-MCNC: 40 MG/DL (ref 30–125)
ERYTHROCYTE [DISTWIDTH] IN BLOOD BY AUTOMATED COUNT: 13 % (ref 11.6–14.5)
FIBRONECTIN FETAL VAG QL: POSITIVE
GLOBULIN SER CALC-MCNC: 3.9 G/DL (ref 2–4)
GLUCOSE BLD STRIP.AUTO-MCNC: 128 MG/DL (ref 70–110)
GLUCOSE SERPL-MCNC: 138 MG/DL (ref 74–99)
HCT VFR BLD AUTO: 34.5 % (ref 35–45)
HGB BLD-MCNC: 11.8 G/DL (ref 12–16)
LDH SERPL L TO P-CCNC: 186 U/L (ref 81–234)
MCH RBC QN AUTO: 29.7 PG (ref 24–34)
MCHC RBC AUTO-ENTMCNC: 34.2 G/DL (ref 31–37)
MCV RBC AUTO: 86.9 FL (ref 74–97)
N GONORRHOEA RRNA SPEC QL NAA+PROBE: NEGATIVE
PLATELET # BLD AUTO: 296 K/UL (ref 135–420)
PMV BLD AUTO: 9.6 FL (ref 9.2–11.8)
POTASSIUM SERPL-SCNC: 3.8 MMOL/L (ref 3.5–5.5)
PROT SERPL-MCNC: 6.7 G/DL (ref 6.4–8.2)
PROT UR-MCNC: <6 MG/DL
PROT/CREAT UR-RTO: NORMAL
RBC # BLD AUTO: 3.97 M/UL (ref 4.2–5.3)
SODIUM SERPL-SCNC: 140 MMOL/L (ref 136–145)
SPECIMEN EXP DATE BLD: NORMAL
SPECIMEN SOURCE: NORMAL
URATE SERPL-MCNC: 5.4 MG/DL (ref 2.6–7.2)
WBC # BLD AUTO: 14.7 K/UL (ref 4.6–13.2)

## 2019-04-25 PROCEDURE — 74011250637 HC RX REV CODE- 250/637: Performed by: ADVANCED PRACTICE MIDWIFE

## 2019-04-25 PROCEDURE — 82962 GLUCOSE BLOOD TEST: CPT

## 2019-04-25 PROCEDURE — 87077 CULTURE AEROBIC IDENTIFY: CPT

## 2019-04-25 PROCEDURE — 86900 BLOOD TYPING SEROLOGIC ABO: CPT

## 2019-04-25 PROCEDURE — 85027 COMPLETE CBC AUTOMATED: CPT

## 2019-04-25 PROCEDURE — 80053 COMPREHEN METABOLIC PANEL: CPT

## 2019-04-25 PROCEDURE — 96374 THER/PROPH/DIAG INJ IV PUSH: CPT

## 2019-04-25 PROCEDURE — 84550 ASSAY OF BLOOD/URIC ACID: CPT

## 2019-04-25 PROCEDURE — 87109 MYCOPLASMA: CPT

## 2019-04-25 PROCEDURE — 87081 CULTURE SCREEN ONLY: CPT

## 2019-04-25 PROCEDURE — 83615 LACTATE (LD) (LDH) ENZYME: CPT

## 2019-04-25 PROCEDURE — 36415 COLL VENOUS BLD VENIPUNCTURE: CPT

## 2019-04-25 PROCEDURE — 96372 THER/PROPH/DIAG INJ SC/IM: CPT

## 2019-04-25 PROCEDURE — 74011250636 HC RX REV CODE- 250/636: Performed by: ADVANCED PRACTICE MIDWIFE

## 2019-04-25 RX ORDER — BETAMETHASONE SODIUM PHOSPHATE AND BETAMETHASONE ACETATE 3; 3 MG/ML; MG/ML
12 INJECTION, SUSPENSION INTRA-ARTICULAR; INTRALESIONAL; INTRAMUSCULAR; SOFT TISSUE EVERY 24 HOURS
Status: COMPLETED | OUTPATIENT
Start: 2019-04-25 | End: 2019-04-26

## 2019-04-25 RX ORDER — INDOMETHACIN 25 MG/1
25 CAPSULE ORAL EVERY 6 HOURS
Status: DISCONTINUED | OUTPATIENT
Start: 2019-04-25 | End: 2019-04-26 | Stop reason: HOSPADM

## 2019-04-25 RX ORDER — ZOLPIDEM TARTRATE 5 MG/1
5 TABLET ORAL
Status: DISCONTINUED | OUTPATIENT
Start: 2019-04-25 | End: 2019-04-26 | Stop reason: HOSPADM

## 2019-04-25 RX ORDER — INDOMETHACIN 25 MG/1
100 CAPSULE ORAL ONCE
Status: COMPLETED | OUTPATIENT
Start: 2019-04-25 | End: 2019-04-25

## 2019-04-25 RX ORDER — BUTORPHANOL TARTRATE 2 MG/ML
2 INJECTION INTRAMUSCULAR; INTRAVENOUS
Status: DISCONTINUED | OUTPATIENT
Start: 2019-04-25 | End: 2019-04-26 | Stop reason: HOSPADM

## 2019-04-25 RX ORDER — SUCRALFATE 1 G/1
1 TABLET ORAL EVERY 6 HOURS
Status: DISCONTINUED | OUTPATIENT
Start: 2019-04-25 | End: 2019-04-26 | Stop reason: HOSPADM

## 2019-04-25 RX ADMIN — SUCRALFATE 1 G: 1 TABLET ORAL at 00:36

## 2019-04-25 RX ADMIN — SUCRALFATE 1 G: 1 TABLET ORAL at 17:33

## 2019-04-25 RX ADMIN — BETAMETHASONE SODIUM PHOSPHATE AND BETAMETHASONE ACETATE 12 MG: 3; 3 INJECTION, SUSPENSION INTRA-ARTICULAR; INTRALESIONAL; INTRAMUSCULAR at 00:38

## 2019-04-25 RX ADMIN — SUCRALFATE 1 G: 1 TABLET ORAL at 06:32

## 2019-04-25 RX ADMIN — INDOMETHACIN 25 MG: 25 CAPSULE ORAL at 18:04

## 2019-04-25 RX ADMIN — SUCRALFATE 1 G: 1 TABLET ORAL at 11:22

## 2019-04-25 RX ADMIN — SUCRALFATE 1 G: 1 TABLET ORAL at 23:44

## 2019-04-25 RX ADMIN — INDOMETHACIN 25 MG: 25 CAPSULE ORAL at 07:04

## 2019-04-25 RX ADMIN — BUTORPHANOL TARTRATE 2 MG: 2 INJECTION, SOLUTION INTRAMUSCULAR; INTRAVENOUS at 01:28

## 2019-04-25 RX ADMIN — INDOMETHACIN 100 MG: 25 CAPSULE ORAL at 01:09

## 2019-04-25 RX ADMIN — INDOMETHACIN 25 MG: 25 CAPSULE ORAL at 12:08

## 2019-04-25 NOTE — PROGRESS NOTES
Verbal report received from [address] [facility] on [address]   being received from L&D for routine progression of care      Report consisted of patients Situation, Background, Assessment and   Recommendations(SBAR). Information from the following report(s) SBAR, Kardex, Procedure Summary and MAR was reviewed with the receiving nurse. Opportunity for questions and clarification was provided. Patient denies contractions at this time. States she will order her dinner in a few minutes. Orientated to room and call light.

## 2019-04-25 NOTE — ROUTINE PROCESS
Bedside and Verbal shift change report given to PAL Ames RN  by Zeina Cantor RN . Report given with Anil LAMAS and MAR.

## 2019-04-25 NOTE — PROGRESS NOTES
Ante Partum Progress Note    Nneka Enamorado  27w1d    Assessment: 27w1d   Hypertension, gestational  and Threatened pre-term labor    Plan:  Continue hospitalization with hospitalized bedrest, Complete  steroid course and Continued tocolysis with indomethacin. Discuss POC with Dr Naomy Ely MD.     Orders/Charges: Non Stress Test  X 2    Patient states she has no new complaints. Feels occasional cramping, but much improved from admission    Vitals:  Visit Vitals  /59   Pulse 92   Temp 98.3 °F (36.8 °C)   Resp 16   Ht 5' 6\" (1.676 m)   Wt 126.1 kg (278 lb)   LMP 10/17/2018   BMI 44.87 kg/m²     Temp (24hrs), Av.3 °F (36.8 °C), Min:98.3 °F (36.8 °C), Max:98.3 °F (36.8 °C)      Last 24hr Input/Output:  No intake or output data in the 24 hours ending 19 0907     Non stress test:  Appropriate for GA    Uterine Activity: Irregular per patient. None on [place]     Exam:  Patient without distress. Abdomen, fundus soft non-tender     Extremities, no redness or tenderness             CERVIX; Cervical Exam  Dilation (cm): 0  Eff: (thick)  Station: Floating  Membrane Status: Intact    Additional Exam: Patient without distress, Abdomen soft, non-tender and Fundus soft and non tender    Labs:     Lab Results   Component Value Date/Time    WBC 14.7 (H) 2019 12:50 AM    WBC 11.9 2018 08:40 AM    WBC 14.8 (H) 10/16/2018 11:10 PM    HGB 11.8 (L) 2019 12:50 AM    HGB 13.1 2018 08:40 AM    HGB 12.2 10/16/2018 11:10 PM    HCT 34.5 (L) 2019 12:50 AM    HCT 37.7 2018 08:40 AM    HCT 35.6 10/16/2018 11:10 PM    PLATELET 173  12:50 AM    PLATELET 906  08:40 AM    PLATELET 635 10//9252 11:10 PM       Recent Results (from the past 24 hour(s))   PROTEIN/CREATININE RATIO, URINE    Collection Time: 19 10:00 PM   Result Value Ref Range    Protein, urine random <6 <11.9 mg/dL    Creatinine, urine 40.00 30 - 125 mg/dL    Protein/Creat.  urine Ratio        CALCULATION NOT PERFORMED WHEN RESULT IS BELOW LINEAR LIMIT   POC URINE MACROSCOPIC    Collection Time: 04/24/19 10:47 PM   Result Value Ref Range    Color YELLOW      Appearance CLEAR      Spec. gravity (POC) 1.010 1.001 - 1.023      pH, urine  (POC) 6.5 5.0 - 9.0      Protein (POC) NEGATIVE  NEG mg/dL    Glucose, urine (POC) NEGATIVE  NEG mg/dL    Ketones (POC) NEGATIVE  NEG mg/dL    Bilirubin (POC) NEGATIVE  NEG      Blood (POC) NEGATIVE  NEG      Urobilinogen (POC) 0.2 0.2 - 1.0 EU/dL    Nitrite (POC) NEGATIVE  NEG      Leukocyte esterase (POC) NEGATIVE  NEG      Performed by Terrance Germain    FETAL FIBRONECTIN    Collection Time: 04/24/19 11:05 PM   Result Value Ref Range    Fetal fibronectin POSITIVE (A) NEG     TYPE & SCREEN    Collection Time: 04/25/19 12:45 AM   Result Value Ref Range    Crossmatch Expiration 04/28/2019     ABO/Rh(D) [place] POSITIVE     Antibody screen NEG    CBC W/O DIFF    Collection Time: 04/25/19 12:50 AM   Result Value Ref Range    WBC 14.7 (H) 4.6 - 13.2 K/uL    RBC 3.97 (L) 4.20 - 5.30 M/uL    HGB 11.8 (L) 12.0 - 16.0 g/dL    HCT 34.5 (L) 35.0 - 45.0 %    MCV 86.9 74.0 - 97.0 FL    MCH 29.7 24.0 - 34.0 PG    MCHC 34.2 31.0 - 37.0 g/dL    RDW 13.0 11.6 - 14.5 %    PLATELET 327 844 - 049 K/uL    MPV 9.6 9.2 - 11.8 FL   GLUCOSE, POC    Collection Time: 04/25/19  2:15 AM   Result Value Ref Range    Glucose (POC) 128 (H) 70 - 640 mg/dL   METABOLIC PANEL, COMPREHENSIVE    Collection Time: 04/25/19  3:57 AM   Result Value Ref Range    Sodium 140 136 - 145 mmol/L    Potassium 3.8 3.5 - 5.5 mmol/L    Chloride 107 100 - 108 mmol/L    CO2 22 21 - 32 mmol/L    Anion gap 11 3.0 - 18 mmol/L    Glucose 138 (H) 74 - 99 mg/dL    BUN 5 (L) 7.0 - 18 MG/DL    Creatinine 0.61 0.6 - 1.3 MG/DL    BUN/Creatinine ratio 8 (L) 12 - 20      GFR est AA >60 >60 ml/min/1.73m2    GFR est non-AA >60 >60 ml/min/1.73m2    Calcium 8.5 8.5 - 10.1 MG/DL    Bilirubin, total 0.3 0.2 - 1.0 MG/DL    ALT (SGPT) 19 13 - 56 U/L    AST (SGOT) 22 15 - 37 U/L    Alk.  phosphatase 62 45 - 117 U/L    Protein, total 6.7 6.4 - 8.2 g/dL    Albumin 2.8 (L) 3.4 - 5.0 g/dL    Globulin 3.9 2.0 - 4.0 g/dL    A-G Ratio 0.7 (L) 0.8 - 1.7     LD    Collection Time: 04/25/19  3:57 AM   Result Value Ref Range     81 - 234 U/L   URIC ACID    Collection Time: 04/25/19  3:57 AM   Result Value Ref Range    Uric acid 5.4 2.6 - 7.2 MG/DL           Signed : Mervin Vasquez CNM

## 2019-04-25 NOTE — ROUTINE PROCESS
Bedside and Verbal shift change report given to Rebeca Hickey RN (oncoming nurse) by PAL Ames RN (offgoing nurse). Report included the following information Kardex, Intake/Output, MAR, Recent Results and Quality Measures.

## 2019-04-25 NOTE — PROGRESS NOTES
at  27w gestation arrived to L&D c/o contractions every 3-5 minutes since 1600 today. Pt was taken to Triage bed 1. Oriented to room and call light and changed into gown. Urine sample obtained. 2240 FFN obtained. SVE closed/thick/high. Difficulty tracing and palpating contractions. Lomas readjusted. Pt provided with a button to push when a contraction is felt. 2335 Pt assisted to right lateral side. Pt states that it feels that the contractions have decreased. 0011 FFN positive. Celestino Mcburney paged with quick return call. Orders received to start steroids 12 mg IM now and second dose in 24 hours from first dose, Indocin and Carafate,  Insert peripheral IV with 1L of LR, Test for gonorrea and chlamydia in urine. Give Stadol for pain and may have Ambien for sleep. 1228 Return call from HECTOR Lewis with Indocin orders. Administer 100 mg of PO Indocin now followed by 25 mg of Indocin in 6 hrs and continue every 6 hrs for 48 hrs. Give Kerafate 1g 30 minutes prior to Indocin administration. 0130 Increased BP noted. STAT  PIH labs ordered. 0145 Pt assisted to right lateral side. EFM readjusted    0230 HECTOR Henry at bedside. Ultrasound completed. SVE 2 outer os, fingertip inner os. Discussed difficulty picking up FHR and contractions due to obesity. Orders received to discontinue continuous monitoring. Group B strep swab and ureaplasma swabs obtained. BP reviewed. Difficult finding correct cuff size. Large cuff to short, regular long cuff still  not long enough. Regular cuff on lower arm placed. [address] [facility] 0250 24 hr urine started. 0512 Pt sleeping.     0700 Bedside and Verbal shift change report given to JAIME Prajapati (oncoming nurse) by Yoshi Taylor (offgoing nurse). Report included the following information SBAR, Kardex, Procedure Summary, Intake/Output, MAR and Recent Results.

## 2019-04-25 NOTE — PROGRESS NOTES
0715 Bedside and Verbal shift change report given to JAIME Qureshi (oncoming nurse) by Guillaume Pepper RN (offgoing nurse). Report included the following information SBAR, Kardex, Intake/Output, MAR and Recent Results. 0800 patient resting in bed eating breakfast, denies needs    0938 EFM applied for 20 minutes of continuous monitoring. 1006 Heart tones appropriate for gestational age, verified with Satnam Weinstein, RN. EFM removed. Patient reporting return of contractions every few minutes, TOCO adjusted     1100 Patient stable, may be transferred to MIU per JOIE Armijo CNM. Will come back for BID FHT monitoring tonight and second dose of steroid. Celestone placed in patient specific bin. 1135 TRANSFER - OUT REPORT:    Verbal report given to Wilman Handy RN (name) on Mariza Greene  being transferred to postpartum(unit) for routine progression of care   (pt. Stable, may be moved to MIU as an antepartum patient)     Report consisted of patients Situation, Background, Assessment and   Recommendations(SBAR). Information from the following report(s) SBAR, Kardex, Intake/Output, MAR and Recent Results was reviewed with the receiving nurse. Lines:   Peripheral IV 04/25/19 Left;Posterior Hand (Active)   Site Assessment Clean, dry, & intact 4/25/2019  7:18 AM   Phlebitis Assessment 0 4/25/2019  7:18 AM   Infiltration Assessment 0 4/25/2019  7:18 AM   Dressing Status Clean, dry, & intact 4/25/2019  7:18 AM   Dressing Type Tape;Transparent 4/25/2019  7:18 AM   Hub Color/Line Status Pink; Infusing 4/25/2019  7:18 AM   Alcohol Cap Used Yes 4/25/2019  7:18 AM        Opportunity for questions and clarification was provided.       Patient transported with:   Registered Nurse

## 2019-04-25 NOTE — H&P
History & Physical    Name: Devaughn Johnson MRN: [id number]  SSN: [id number]    YOB: 1985  Age: 28 y.o. Sex: female      Subjective:     Estimated Date of Delivery: 19  OB History    Para Term  AB Living   4 2 2   1     SAB TAB Ectopic Molar Multiple Live Births   1                # Outcome Date GA Lbr [place]/2nd Weight Sex Delivery Anes PTL Lv   4 Current            3 SAB            2 Term            1 Term                Ms. Ricco Sosa is a  admitted with pregnancy at 27w1d for observation due to  contractions and positive FFN. Prenatal course was complicated by hx of  labor with previous pregnancies which was successfully stopped and went on to deliver fullterm, velamentous cord insertion, failed 1 hour and has not yet completed 3 hour GTT, AMA, SC trait, uterine fibroids, and obesity. She also reports history of gestational HTN in previous prengancies but was never on medications and denies pre-eclampsia. .  Denies headaches, vision changes, epigastric pain, and swelling. Please see prenatal records for details. Past Medical History:   Diagnosis Date    Sickle cell trait syndrome ([facility] [address]      Past Surgical History:   Procedure Laterality Date    HX CHOLECYSTECTOMY           Social History     Occupational History    Not on file   Tobacco Use    Smoking status: Never Smoker    Smokeless tobacco: Never Used   Substance and Sexual Activity    Alcohol use: No    Drug use: No    Sexual activity: Yes     Partners: Male     No family history on file. Allergies   Allergen Reactions    Amoxicillin Rash     Prior to Admission medications    Medication Sig Start Date End Date Taking? Authorizing Provider   cholecalciferol, vitamin D3, (VITAMIN D3) 2,000 unit tab Take 2,000 Units by mouth daily. Yes Provider, Historical   prenatal vit/iron fum/folic ac (PRENATAL 1+1 PO) Take 1 Tab by mouth daily.    Yes Provider, Historical        Review of Systems: A comprehensive review of systems was negative except for that written in the History of Present Illness. Objective:     Vitals:  Vitals:    04/25/19 0005 04/25/19 0119 04/25/19 0131 04/25/19 0140   BP: 135/72 163/84 171/78 143/90   Pulse: (!) 106 95 (!) 101 99   Resp:    (P) 16   Temp:    (P) 98.3 °F (36.8 °C)   Weight:       Height:            Physical Exam:  Patient without distress. Heart: Regular rate and rhythm, S1S2 present or without murmur or extra heart sounds  Lung: clear to auscultation throughout lung fields, no wheezes, no rales, no rhonchi and normal respiratory effort  Back: costovertebral angle tenderness absent  Abdomen: soft, nontender, transverse/unstable lie via ultrasound   Fundus: soft and non tender  Perineum: blood absent, amniotic fluid absent  Cervical Exam: internal os 0.5-1 cm   Lower Extremities: no erythema warmth tenderness or edema   Membranes:  Intact  Fetal Heart Rate: Reactive and resasuring for GA   Triangle: Difficult to assess contractions d/t maternal habitus. Per patient, contractions irregular every 2-10 minutes. Prenatal Labs:   Lab Results   Component Value Date/Time    ABO/Rh(D) PENDING 04/25/2019 12:45 AM       Impression/Plan:     Active Problems:    IUP (intrauterine pregnancy), incidental (4/24/2019)         Plan: Admit for observation, ANCSx2, GCCT, GBS, Myco/ureaplasma, urine culture, PIH labs, Protein:Creat ratio, 24 hour urine, Indocin and carafate.

## 2019-04-26 VITALS
SYSTOLIC BLOOD PRESSURE: 121 MMHG | RESPIRATION RATE: 20 BRPM | HEIGHT: 66 IN | OXYGEN SATURATION: 100 % | BODY MASS INDEX: 44.68 KG/M2 | WEIGHT: 278 LBS | TEMPERATURE: 98.7 F | DIASTOLIC BLOOD PRESSURE: 59 MMHG | HEART RATE: 97 BPM

## 2019-04-26 LAB
COLLECT DURATION TIME UR: 24 HR
PROT 24H UR-MRATE: 364 MG/24HR
SPECIMEN VOL ?TM UR: 4550 ML

## 2019-04-26 PROCEDURE — 74011250637 HC RX REV CODE- 250/637: Performed by: ADVANCED PRACTICE MIDWIFE

## 2019-04-26 PROCEDURE — 96372 THER/PROPH/DIAG INJ SC/IM: CPT

## 2019-04-26 PROCEDURE — 65270000029 HC RM PRIVATE

## 2019-04-26 RX ORDER — ACETAMINOPHEN 325 MG/1
650 TABLET ORAL
Status: COMPLETED | OUTPATIENT
Start: 2019-04-26 | End: 2019-04-26

## 2019-04-26 RX ADMIN — ACETAMINOPHEN 650 MG: 325 TABLET ORAL at 08:26

## 2019-04-26 RX ADMIN — INDOMETHACIN 25 MG: 25 CAPSULE ORAL at 18:14

## 2019-04-26 RX ADMIN — SUCRALFATE 1 G: 1 TABLET ORAL at 17:43

## 2019-04-26 RX ADMIN — INDOMETHACIN 25 MG: 25 CAPSULE ORAL at 06:42

## 2019-04-26 RX ADMIN — INDOMETHACIN 25 MG: 25 CAPSULE ORAL at 00:50

## 2019-04-26 RX ADMIN — BETAMETHASONE SODIUM PHOSPHATE AND BETAMETHASONE ACETATE 12 MG: 3; 3 INJECTION, SUSPENSION INTRA-ARTICULAR; INTRALESIONAL; INTRAMUSCULAR at 00:51

## 2019-04-26 RX ADMIN — SUCRALFATE 1 G: 1 TABLET ORAL at 05:42

## 2019-04-26 RX ADMIN — INDOMETHACIN 25 MG: 25 CAPSULE ORAL at 12:17

## 2019-04-26 RX ADMIN — SUCRALFATE 1 G: 1 TABLET ORAL at 11:41

## 2019-04-26 NOTE — PROGRESS NOTES
NST reviewed with JOSÉ MANUEL Medina. Reactive tracing for gestational age. Pt denies further needs at this time.

## 2019-04-26 NOTE — ROUTINE PROCESS
Bedside and Verbal shift change report given to PAL Phillip RN by Frankey Sierras, RN. Report included the following information SBAR, Kardex, OR Summary, Intake/Output and MAR.

## 2019-04-26 NOTE — DISCHARGE INSTRUCTIONS
To whom it May Concern: Ms. Alannah Christie was seen on 2019 for  contractions. She was treated and sent home. She should continue modified bedrest until seen in the office on Tuesday. She may return to work on Wednesday pending exam in the office. If you have any questions you may call our office.      Simona Epps, [address] [facility]  [phone]

## 2019-04-26 NOTE — PROGRESS NOTES
Ante Partum Progress Note    Dell Leigh  27w2d    Assessment: 27w2d    contractions    Plan:  Discharge home with the following: Activity: modified bedrest Diet: regular. Follow up in office: next week. Medications: prenatal vitamins. Patient states she does not have abdominal pain   and contractions    Vitals:  Visit Vitals  /59 (BP 1 Location: Right arm, BP Patient Position: At rest) Comment (BP 1 Location): Right forearm. Pulse 97   Temp 98.7 °F (37.1 °C)   Resp 20   Ht 5' 6\" (1.676 m)   Wt 126.1 kg (278 lb)   LMP 10/17/2018   SpO2 100%   BMI 44.87 kg/m²     Temp (24hrs), Av.5 °F (36.9 °C), Min:98.2 °F (36.8 °C), Max:98.7 °F (37.1 °C)      Last 24hr Input/Output:    Intake/Output Summary (Last 24 hours) at 2019 1721  Last data filed at 2019 0240  Gross per 24 hour   Intake --   Output 1750 ml   Net -1750 ml        Non stress test:  Reactive    Uterine Activity: Mild     Exam:  Patient without distress. Abdomen, fundus soft non-tender     Extremities, no redness or tenderness             CERVIX; Cervical Exam  Dilation (cm): 0  Eff: (thick)  Station: Floating  Membrane Status: Intact    Additional Exam: Deferred    Labs:     Lab Results   Component Value Date/Time    WBC 14.7 (H) 2019 12:50 AM    WBC 11.9 2018 08:40 AM    WBC 14.8 (H) 10/16/2018 11:10 PM    HGB 11.8 (L) 2019 12:50 AM    HGB 13.1 2018 08:40 AM    HGB 12.2 10/16/2018 11:10 PM    HCT 34.5 (L) 2019 12:50 AM    HCT 37.7 2018 08:40 AM    HCT 35.6 10/16/2018 11:10 PM    PLATELET 378  12:50 AM    PLATELET 623  08:40 AM    PLATELET 903  11:10 PM       Recent Results (from the past 24 hour(s))   PROTEIN, URINE, 24 HR    Collection Time: 19  2:50 AM   Result Value Ref Range    Period of collection 24 hr    Volume 4,550 mL    Protein,urine 24 hr 364 (H) <149.1 mg/24hr       Pt received 2nd dose of ANCS. Modified bedrest this weekend.  Follow up in office on Tuesday.  labor precautions given.      Signed : Luann Reyes CNM

## 2019-04-26 NOTE — PROGRESS NOTES
1130 Received care of pt in L&D for NST no distress,  1230 pt returned to room @ this time , in bed resting, denies contxs,ROM or vag discharge, notified to call if onset   1420 BEDSIDE_VERBAL_RECORDED_WRITTEN: shift change report given to [address] [place] [address] (oncoming nurse) by lillian Brice (offgoing nurse). Report given with ADAMS, Anil and MAR.

## 2019-04-26 NOTE — PROGRESS NOTES
1915 - Received report from PAL rPater RN. Assumed care of patient at this time. 2000 - Assessment complete. Please refer to flowsheet. No complaints of pain. Denies any cramping or contractions. Informed her of s/s to be aware of to notify nurse. Plan of care addressed with patient. No questions or concerns at this time. Call bell left within reach.

## 2019-04-26 NOTE — ROUTINE PROCESS
I have reviewed discharge instructions with the patient. The patient verbalized understanding. Pre-term care instructions also reviewed. Patient is on modified bed rest May return to work pending office visit on Tuesday 04/30/2019.

## 2019-04-27 LAB
BACTERIA SPEC CULT: ABNORMAL
SERVICE CMNT-IMP: ABNORMAL
SERVICE CMNT-IMP: ABNORMAL

## 2019-05-01 LAB
M HOMINIS SPEC QL CULT: NEGATIVE
SPECIMEN SOURCE: ABNORMAL
U UREALYTICUM SPEC QL CULT: POSITIVE

## 2019-06-12 ENCOUNTER — HOSPITAL ENCOUNTER (EMERGENCY)
Age: 34
Discharge: HOME | End: 2019-06-12
Attending: OBSTETRICS & GYNECOLOGY | Admitting: OBSTETRICS & GYNECOLOGY
Payer: COMMERCIAL

## 2019-06-12 VITALS
DIASTOLIC BLOOD PRESSURE: 58 MMHG | HEIGHT: 66 IN | HEART RATE: 96 BPM | WEIGHT: 279 LBS | SYSTOLIC BLOOD PRESSURE: 121 MMHG | TEMPERATURE: 98.8 F | BODY MASS INDEX: 44.84 KG/M2 | RESPIRATION RATE: 18 BRPM

## 2019-06-12 LAB
ALBUMIN SERPL-MCNC: 2.7 G/DL (ref 3.4–5)
ALBUMIN/GLOB SERPL: 0.7 {RATIO} (ref 0.8–1.7)
ALP SERPL-CCNC: 85 U/L (ref 45–117)
ALT SERPL-CCNC: 21 U/L (ref 13–56)
ANION GAP SERPL CALC-SCNC: 10 MMOL/L (ref 3–18)
APPEARANCE UR: CLEAR
AST SERPL-CCNC: 26 U/L (ref 15–37)
BILIRUB SERPL-MCNC: 0.4 MG/DL (ref 0.2–1)
BILIRUB UR QL: NEGATIVE
BUN SERPL-MCNC: 6 MG/DL (ref 7–18)
BUN/CREAT SERPL: 9 (ref 12–20)
CALCIUM SERPL-MCNC: 9.1 MG/DL (ref 8.5–10.1)
CHLORIDE SERPL-SCNC: 107 MMOL/L (ref 100–108)
CO2 SERPL-SCNC: 23 MMOL/L (ref 21–32)
COLOR UR: NORMAL
CREAT SERPL-MCNC: 0.65 MG/DL (ref 0.6–1.3)
ERYTHROCYTE [DISTWIDTH] IN BLOOD BY AUTOMATED COUNT: 13.6 % (ref 11.6–14.5)
GLOBULIN SER CALC-MCNC: 4 G/DL (ref 2–4)
GLUCOSE SERPL-MCNC: 82 MG/DL (ref 74–99)
GLUCOSE UR QL STRIP.AUTO: NEGATIVE MG/DL
HCT VFR BLD AUTO: 36.6 % (ref 35–45)
HGB BLD-MCNC: 12.5 G/DL (ref 12–16)
KETONES UR-MCNC: NEGATIVE MG/DL
LDH SERPL L TO P-CCNC: 240 U/L (ref 81–234)
LEUKOCYTE ESTERASE UR QL STRIP: NEGATIVE
MCH RBC QN AUTO: 29.8 PG (ref 24–34)
MCHC RBC AUTO-ENTMCNC: 34.2 G/DL (ref 31–37)
MCV RBC AUTO: 87.4 FL (ref 74–97)
NITRITE UR QL: NEGATIVE
PH UR: 6.5 [PH] (ref 5–9)
PLATELET # BLD AUTO: 249 K/UL (ref 135–420)
PMV BLD AUTO: 9.6 FL (ref 9.2–11.8)
POTASSIUM SERPL-SCNC: 3.8 MMOL/L (ref 3.5–5.5)
PROT SERPL-MCNC: 6.7 G/DL (ref 6.4–8.2)
PROT UR QL: NEGATIVE MG/DL
RBC # BLD AUTO: 4.19 M/UL (ref 4.2–5.3)
RBC # UR STRIP: NEGATIVE /UL
SERVICE CMNT-IMP: NORMAL
SODIUM SERPL-SCNC: 140 MMOL/L (ref 136–145)
SP GR UR: 1.01 (ref 1–1.02)
URATE SERPL-MCNC: 5.7 MG/DL (ref 2.6–7.2)
UROBILINOGEN UR QL: 0.2 EU/DL (ref 0.2–1)
WBC # BLD AUTO: 13.9 K/UL (ref 4.6–13.2)

## 2019-06-12 PROCEDURE — 83615 LACTATE (LD) (LDH) ENZYME: CPT

## 2019-06-12 PROCEDURE — 84550 ASSAY OF BLOOD/URIC ACID: CPT

## 2019-06-12 PROCEDURE — 96360 HYDRATION IV INFUSION INIT: CPT

## 2019-06-12 PROCEDURE — 99283 EMERGENCY DEPT VISIT LOW MDM: CPT

## 2019-06-12 PROCEDURE — 85027 COMPLETE CBC AUTOMATED: CPT

## 2019-06-12 PROCEDURE — 81003 URINALYSIS AUTO W/O SCOPE: CPT

## 2019-06-12 PROCEDURE — 80053 COMPREHEN METABOLIC PANEL: CPT

## 2019-06-12 PROCEDURE — 74011250636 HC RX REV CODE- 250/636: Performed by: ADVANCED PRACTICE MIDWIFE

## 2019-06-12 PROCEDURE — 59025 FETAL NON-STRESS TEST: CPT

## 2019-06-12 RX ORDER — SODIUM CHLORIDE, SODIUM LACTATE, POTASSIUM CHLORIDE, CALCIUM CHLORIDE 600; 310; 30; 20 MG/100ML; MG/100ML; MG/100ML; MG/100ML
125 INJECTION, SOLUTION INTRAVENOUS CONTINUOUS
Status: DISCONTINUED | OUTPATIENT
Start: 2019-06-12 | End: 2019-06-12

## 2019-06-12 RX ADMIN — SODIUM CHLORIDE, SODIUM LACTATE, POTASSIUM CHLORIDE, AND CALCIUM CHLORIDE 1000 ML: 600; 310; 30; 20 INJECTION, SOLUTION INTRAVENOUS at 01:54

## 2019-06-12 NOTE — DISCHARGE INSTRUCTIONS
Patient Education   Patient Education   Patient Education   Patient Education        Pregnancy Precautions: Care Instructions  Your Care Instructions    There is no sure way to prevent labor before your due date ( labor) or to prevent most other pregnancy problems. But there are things you can do to increase your chances of a healthy pregnancy. Go to your appointments, follow your doctor's advice, and take good care of yourself. Eat well, and exercise (if your doctor agrees). And make sure to drink plenty of water. Follow-up care is a key part of your treatment and safety. Be sure to make and go to all appointments, and call your doctor if you are having problems. It's also a good idea to know your test results and keep a list of the medicines you take. How can you care for yourself at home? · Make sure you go to your prenatal appointments. At each visit, your doctor will check your blood pressure. Your doctor will also check to see if you have protein in your urine. High blood pressure and protein in urine are signs of preeclampsia. This condition can be dangerous for you and your baby. · Drink plenty of fluids, enough so that your urine is light yellow or clear like water. Dehydration can cause contractions. If you have kidney, heart, or liver disease and have to limit fluids, talk with your doctor before you increase the amount of fluids you drink. · Tell your doctor right away if you notice any symptoms of an infection, such as:  ? Burning when you urinate. ? A foul-smelling discharge from your vagina. ? Vaginal itching. ? Unexplained fever. ? Unusual pain or soreness in your uterus or lower belly. · Eat a balanced diet. Include plenty of foods that are high in calcium and iron. ? Foods high in calcium include milk, cheese, yogurt, almonds, and broccoli. ? Foods high in iron include red meat, shellfish, poultry, eggs, beans, raisins, whole-grain bread, and leafy green vegetables.   · Do not smoke. If you need help quitting, talk to your doctor about stop-smoking programs and medicines. These can increase your chances of quitting for good. · Do not drink alcohol or use illegal drugs. · Follow your doctor's directions about activity. Your doctor will let you know how much, if any, exercise you can do. · Ask your doctor if you can have sex. If you are at risk for early labor, your doctor may ask you to not have sex. · Take care to prevent falls. During pregnancy, your joints are loose, and your balance is off. Sports such as bicycling, skiing, or in-line skating can increase your risk of falling. And don't ride horses or motorcycles, dive, water ski, scuba dive, or parachute jump while you are pregnant. · Avoid getting very hot. Do not use saunas or hot tubs. Avoid staying out in the sun in hot weather for long periods. Take acetaminophen (Tylenol) to lower a high fever. · Do not take any over-the-counter or herbal medicines or supplements without talking to your doctor or pharmacist first.  When should you call for help? Call 911 anytime you think you may need emergency care. For example, call if:    · You passed out (lost consciousness).     · You have severe vaginal bleeding.     · You have severe pain in your belly or pelvis.     · You have had fluid gushing or leaking from your vagina and you know or think the umbilical cord is bulging into your vagina. If this happens, immediately get down on your knees so your rear end (buttocks) is higher than your head. This will decrease the pressure on the cord until help arrives.   [facility] your doctor now or seek immediate medical care if:    · You have signs of preeclampsia, such as:  ? Sudden swelling of your face, hands, or feet. ? New vision problems (such as dimness or blurring).   ? A severe headache.     · You have any vaginal bleeding.     · You have belly pain or cramping.     · You have a fever.     · You have had regular contractions (with or without pain) for an hour. This means that you have 8 or more within 1 hour or 4 or more in 20 minutes after you change your position and drink fluids.     · You have a sudden release of fluid from your vagina.     · You have low back pain or pelvic pressure that does not go away.     · You notice that your baby has stopped moving or is moving much less than normal.    Watch closely for changes in your health, and be sure to contact your doctor if you have any problems. Where can you learn more? Go to http://mlian-germain.info/. Enter [id number] in the search box to learn more about \"Pregnancy Precautions: Care Instructions. \"  Current as of: September 5, 2018  Content Version: 11.9  © 3742-6266 [email or web address]. Care instructions adapted under license by [employer] (which disclaims liability or warranty for this information). If you have questions about a medical condition or this instruction, always ask your healthcare professional. [place] [address] any warranty or liability for your use of this information. Counting Your Baby's Kicks: Care Instructions  Your Care Instructions    Counting your baby's kicks is one way your doctor can tell that your baby is healthy. Most women--especially in a first pregnancy--feel their baby move for the first time between 16 and 22 weeks. The movement may feel like flutters rather than kicks. Your baby may move more at certain times of the day. When you are active, you may notice less kicking than when you are resting. At your prenatal visits, your doctor will ask whether the baby is active. In your last trimester, your doctor may ask you to count the number of times you feel your baby move. Follow-up care is a key part of your treatment and safety. Be sure to make and go to all appointments, and call your doctor if you are having problems.  It's also a good idea to know your test results and keep a list of the medicines you take. How do you count fetal kicks? · A common method of checking your baby's movement is to count the number of kicks or moves you feel in 1 hour. Ten movements (such as kicks, flutters, or rolls) in 1 hour are normal. Some doctors suggest that you count in the morning until you get to 10 movements. Then you can quit for that day and start again the next day. · Pick your baby's most active time of day to count. This may be any time from morning to evening. · If you do not feel 10 movements in an hour, your baby may be sleeping. Wait for the next hour and count again. When should you call for help? Call your doctor now or seek immediate medical care if:    · You noticed that your baby has stopped moving or is moving much less than normal.    Watch closely for changes in your health, and be sure to contact your doctor if you have any problems. Where can you learn more? Go to http://milan-germain.info/. Enter I661 in the search box to learn more about \"Counting Your Baby's Kicks: Care Instructions. \"  Current as of: September 5, 2018  Content Version: 11.9  © 3551-7124 [employer]. Care instructions adapted under license by [employer] (which disclaims liability or warranty for this information). If you have questions about a medical condition or this instruction, always ask your healthcare professional. [place] [address] any warranty or liability for your use of this information. Learning About Having Too Much Amniotic Fluid  What is too much amniotic fluid? While you are pregnant, amniotic fluid in the uterus protects your baby from being bumped or hurt as you move your body. And it keeps your baby at a healthy temperature. The fluid also helps your baby move around. Having too much of this fluid is called polyhydramnios. It means that there's more fluid around your baby than there should be.   In some cases, too much amniotic fluid doesn't cause problems. In other cases, it can cause problems such as  labor. Or it may increase your chance of needing a  delivery (). What causes it? The cause of too much amniotic fluid may not be found. But causes may include:  · Diabetes in the mother. This includes diabetes that occurs during pregnancy (gestational diabetes). · Problems with the baby's development. Examples are genetic disorders and birth defects. · Being pregnant with twins or more. What are the symptoms? You may not have any symptoms of too much amniotic fluid. Often it's found during a routine ultrasound. But some women do have symptoms, which may include:  · A uterus that is larger than expected for the age of the pregnancy. Your doctor will find this out. · Shortness of breath. · Signs of early labor ( contractions). How is it diagnosed? Your doctor can use ultrasound to see if you have too much amniotic fluid. This test is used to measure the pockets of amniotic fluid that surround the baby. If these measurements show too much fluid, more tests may be done to try to find the cause. How is it treated? The type of treatment you get depends on how much amniotic fluid you have. It also depends on how far along you are in your pregnancy and what your symptoms are. Your doctor may use a needle to remove extra fluid from the amniotic sac. Or you may be given medicine to help reduce the amount of fluid. Some women may get both treatments or more than one round of treatment. In some cases, if your pregnancy has progressed far enough, your doctor may recommend having your baby early. Other women may not need treatment. Sometimes the problem may correct itself over time. Whether or not you need treatment, you will still be watched closely throughout your pregnancy and labor. Follow-up care is a key part of your treatment and safety.  Be sure to make and go to all appointments, and call your doctor if you are having problems. It's also a good idea to know your test results and keep a list of the medicines you take. Where can you learn more? Go to http://milan-germain.info/. Enter P121 in the search box to learn more about \"Learning About Having Too Much Amniotic Fluid. \"  Current as of: 2018  Content Version: 11.9  © 5963-1015 [employer]. Care instructions adapted under license by [employer] (which disclaims liability or warranty for this information). If you have questions about a medical condition or this instruction, always ask your healthcare professional. [place] [address] any warranty or liability for your use of this information.  Labor: Care Instructions  Your Care Instructions     labor is the start of labor between 21 and 36 weeks of pregnancy. A full-term pregnancy lasts 37 to 42 weeks. In labor, the uterus contracts to open the cervix. This is the first stage of childbirth.  labor can be caused by a problem with the baby, the mother, or both. Often the cause is not known. In some cases, doctors use medicines to try to delay labor until 29 or more weeks of pregnancy. By this time, a baby has grown enough so that problems are not likely. In some cases--such as with a serious infection--it is healthier for the baby to be born early. Your treatment will depend on how far along you are in your pregnancy and on your health and your baby's health. Follow-up care is a key part of your treatment and safety. Be sure to make and go to all appointments, and call your doctor if you are having problems. It's also a good idea to know your test results and keep a list of the medicines you take. How can you care for yourself at home? · If your doctor prescribed medicines, take them exactly as directed. Call your doctor if you think you are having a problem with your medicine.   · Rest until your doctor advises you about activity. He or she will tell you if you should stay in bed most of the time. You may need to arrange for  if you have young children. · Do not have sexual intercourse unless your doctor says it is safe. · Use pads, not tampons, if you have vaginal bleeding. · Make sure to drink plenty of fluids. Dehydration can lead to contractions. If you have kidney, heart, or liver disease and have to limit fluids, talk with your doctor before you increase the amount of fluids you drink. · Do not smoke or allow others to smoke around you. If you need help quitting, talk to your doctor about stop-smoking programs and medicines. These can increase your chances of quitting for good. When should you call for help? Call 911 anytime you think you may need emergency care. For example, call if:    · You passed out (lost consciousness).     · You have severe vaginal bleeding.     · You have severe pain in your belly or pelvis.     · You have had fluid gushing or leaking from your vagina and you know or think the umbilical cord is bulging into your vagina. If this happens, immediately get down on your knees so your rear end (buttocks) is higher than your head. This will decrease the pressure on the cord until help arrives.   [facility] your doctor now or seek immediate medical care if:    · You have signs of preeclampsia, such as:  ? Sudden swelling of your face, hands, or feet. ? New vision problems (such as dimness or blurring). ? A severe headache.     · You have any vaginal bleeding.     · You have belly pain or cramping.     · You have a fever.     · You have had regular contractions (with or without pain) for an hour.  This means that you have 6 or more within 1 hour after you change your position and drink fluids.     · You have a sudden release of fluid from the vagina.     · You have low back pain or pelvic pressure that does not go away.     · You notice that your baby has stopped moving or is moving much less than normal.    Watch closely for changes in your health, and be sure to contact your doctor if you have any problems. Where can you learn more? Go to http://milan-germain.info/. Enter Q400 in the search box to learn more about \" Labor: Care Instructions. \"  Current as of: 2018  Content Version: 11.9  © 1521-5688 [employer]. Care instructions adapted under license by [facility] (which disclaims liability or warranty for this information). If you have questions about a medical condition or this instruction, always ask your healthcare professional. [address] any warranty or liability for your use of this information.

## 2019-06-12 NOTE — ROUTINE PROCESS
6/12/2019 RE: Lillie Pichardo To Whom it May Concern: This is to certify that Lillie Pichardo may may return to work on 6/13/19. Please feel free to contact my office if you have any questions or concerns. Thank you for your assistance in this matter. Sincerely, CHADWICK Aponte

## 2019-06-18 ENCOUNTER — HOSPITAL ENCOUNTER (OUTPATIENT)
Age: 34
Discharge: HOME | End: 2019-06-18
Attending: OBSTETRICS & GYNECOLOGY | Admitting: OBSTETRICS & GYNECOLOGY
Payer: COMMERCIAL

## 2019-06-18 VITALS
TEMPERATURE: 98.6 F | RESPIRATION RATE: 18 BRPM | HEART RATE: 98 BPM | BODY MASS INDEX: 44.68 KG/M2 | HEIGHT: 66 IN | DIASTOLIC BLOOD PRESSURE: 56 MMHG | WEIGHT: 278 LBS | SYSTOLIC BLOOD PRESSURE: 106 MMHG

## 2019-06-18 PROBLEM — O24.913 DIABETES MELLITUS AFFECTING PREGNANCY IN THIRD TRIMESTER: Status: ACTIVE | Noted: 2019-06-18

## 2019-06-18 PROBLEM — Z3A.34 34 WEEKS GESTATION OF PREGNANCY: Status: ACTIVE | Noted: 2019-06-18

## 2019-06-18 PROCEDURE — 59025 FETAL NON-STRESS TEST: CPT

## 2019-06-18 NOTE — PROGRESS NOTES
Triage Progress Note  Patient sent to labor and delivery from the office for continued fetal monitoring. Scheduled NST in office today for GDMA1 and polyhydramnios: reassuring however non-reactive. Pt seen: monitors applied. Will evaluate  fetal heart rate and contraction pattern and d/c home with routine f/u once reactive NST obtained. No data found. Physical Exam:  Cervical Exam:  deferred  Membranes:  Intact  Uterine Activity: NST applied  Fetal Heart Rate: NST applied    Assessment/Plan:  Will obtain NST and d/c home with routine f/u once reactive.

## 2019-06-18 NOTE — PROGRESS NOTES
[address] @ 34.6 weeks arrived from office for NST d/t poly and GDM. To bathroom to void, ambulated to bed and ocnnected to EFM. 1115 Pt reports good fetal movement. [address] [facility] GREERM at bedside, reviewed NST, discharge orders received.

## 2019-07-08 ENCOUNTER — APPOINTMENT (OUTPATIENT)
Dept: GENERAL RADIOLOGY | Age: 34
DRG: 776 | End: 2019-07-08
Attending: PHYSICIAN ASSISTANT
Payer: COMMERCIAL

## 2019-07-08 ENCOUNTER — HOSPITAL ENCOUNTER (EMERGENCY)
Age: 34
Discharge: STILL ADMITTED OR ADMINISTRATIVE | End: 2019-07-08
Attending: OBSTETRICS & GYNECOLOGY | Admitting: OBSTETRICS & GYNECOLOGY

## 2019-07-08 ENCOUNTER — HOSPITAL ENCOUNTER (INPATIENT)
Age: 34
LOS: 1 days | Discharge: OTHER INSTITUTION | DRG: 776 | End: 2019-07-09
Attending: EMERGENCY MEDICINE | Admitting: OBSTETRICS & GYNECOLOGY
Payer: COMMERCIAL

## 2019-07-08 DIAGNOSIS — O14.90 PRE-ECLAMPSIA, ANTEPARTUM: Primary | ICD-10-CM

## 2019-07-08 LAB
ALBUMIN SERPL-MCNC: 2.3 G/DL (ref 3.4–5)
ALBUMIN/GLOB SERPL: 0.6 {RATIO} (ref 0.8–1.7)
ALP SERPL-CCNC: 77 U/L (ref 45–117)
ALT SERPL-CCNC: 44 U/L (ref 13–56)
ANION GAP SERPL CALC-SCNC: 9 MMOL/L (ref 3–18)
APPEARANCE UR: ABNORMAL
AST SERPL-CCNC: 41 U/L (ref 15–37)
BACTERIA URNS QL MICRO: ABNORMAL /HPF
BASOPHILS # BLD: 0 K/UL (ref 0–0.1)
BASOPHILS NFR BLD: 0 % (ref 0–2)
BILIRUB SERPL-MCNC: 0.5 MG/DL (ref 0.2–1)
BILIRUB UR QL: NEGATIVE
BUN SERPL-MCNC: 3 MG/DL (ref 7–18)
BUN/CREAT SERPL: 4 (ref 12–20)
CALCIUM SERPL-MCNC: 8.2 MG/DL (ref 8.5–10.1)
CHLORIDE SERPL-SCNC: 111 MMOL/L (ref 100–108)
CO2 SERPL-SCNC: 24 MMOL/L (ref 21–32)
COLOR UR: ABNORMAL
CREAT SERPL-MCNC: 0.77 MG/DL (ref 0.6–1.3)
DIFFERENTIAL METHOD BLD: ABNORMAL
EOSINOPHIL # BLD: 0.7 K/UL (ref 0–0.4)
EOSINOPHIL NFR BLD: 6 % (ref 0–5)
EPITH CASTS URNS QL MICRO: ABNORMAL /LPF (ref 0–5)
ERYTHROCYTE [DISTWIDTH] IN BLOOD BY AUTOMATED COUNT: 14.1 % (ref 11.6–14.5)
GLOBULIN SER CALC-MCNC: 3.6 G/DL (ref 2–4)
GLUCOSE SERPL-MCNC: 103 MG/DL (ref 74–99)
GLUCOSE UR STRIP.AUTO-MCNC: NEGATIVE MG/DL
HCT VFR BLD AUTO: 24.2 % (ref 35–45)
HGB BLD-MCNC: 8.3 G/DL (ref 12–16)
HGB UR QL STRIP: ABNORMAL
KETONES UR QL STRIP.AUTO: NEGATIVE MG/DL
LEUKOCYTE ESTERASE UR QL STRIP.AUTO: ABNORMAL
LYMPHOCYTES # BLD: 3 K/UL (ref 0.9–3.6)
LYMPHOCYTES NFR BLD: 27 % (ref 21–52)
MAGNESIUM SERPL-MCNC: 1.7 MG/DL (ref 1.6–2.6)
MCH RBC QN AUTO: 29.7 PG (ref 24–34)
MCHC RBC AUTO-ENTMCNC: 34.3 G/DL (ref 31–37)
MCV RBC AUTO: 86.7 FL (ref 74–97)
MONOCYTES # BLD: 0.7 K/UL (ref 0.05–1.2)
MONOCYTES NFR BLD: 6 % (ref 3–10)
NEUTS SEG # BLD: 6.7 K/UL (ref 1.8–8)
NEUTS SEG NFR BLD: 61 % (ref 40–73)
NITRITE UR QL STRIP.AUTO: NEGATIVE
PH UR STRIP: 6 [PH] (ref 5–8)
PLATELET # BLD AUTO: 280 K/UL (ref 135–420)
PMV BLD AUTO: 8.8 FL (ref 9.2–11.8)
POTASSIUM SERPL-SCNC: 3.5 MMOL/L (ref 3.5–5.5)
PROT SERPL-MCNC: 5.9 G/DL (ref 6.4–8.2)
PROT UR STRIP-MCNC: 100 MG/DL
RBC # BLD AUTO: 2.79 M/UL (ref 4.2–5.3)
RBC #/AREA URNS HPF: ABNORMAL /HPF (ref 0–5)
SODIUM SERPL-SCNC: 144 MMOL/L (ref 136–145)
SP GR UR REFRACTOMETRY: 1.02 (ref 1–1.03)
UROBILINOGEN UR QL STRIP.AUTO: 1 EU/DL (ref 0.2–1)
WBC # BLD AUTO: 11 K/UL (ref 4.6–13.2)
WBC URNS QL MICRO: ABNORMAL /HPF (ref 0–5)

## 2019-07-08 PROCEDURE — 96365 THER/PROPH/DIAG IV INF INIT: CPT

## 2019-07-08 PROCEDURE — 99285 EMERGENCY DEPT VISIT HI MDM: CPT

## 2019-07-08 PROCEDURE — 65270000029 HC RM PRIVATE

## 2019-07-08 PROCEDURE — 96367 TX/PROPH/DG ADDL SEQ IV INF: CPT

## 2019-07-08 PROCEDURE — 85025 COMPLETE CBC W/AUTO DIFF WBC: CPT

## 2019-07-08 PROCEDURE — 74011250636 HC RX REV CODE- 250/636: Performed by: PHYSICIAN ASSISTANT

## 2019-07-08 PROCEDURE — 96368 THER/DIAG CONCURRENT INF: CPT

## 2019-07-08 PROCEDURE — 96361 HYDRATE IV INFUSION ADD-ON: CPT

## 2019-07-08 PROCEDURE — 83735 ASSAY OF MAGNESIUM: CPT

## 2019-07-08 PROCEDURE — 96366 THER/PROPH/DIAG IV INF ADDON: CPT

## 2019-07-08 PROCEDURE — 80053 COMPREHEN METABOLIC PANEL: CPT

## 2019-07-08 PROCEDURE — 71046 X-RAY EXAM CHEST 2 VIEWS: CPT

## 2019-07-08 PROCEDURE — 81001 URINALYSIS AUTO W/SCOPE: CPT

## 2019-07-08 PROCEDURE — 93005 ELECTROCARDIOGRAM TRACING: CPT

## 2019-07-08 RX ORDER — ACETAMINOPHEN 10 MG/ML
1000 INJECTION, SOLUTION INTRAVENOUS ONCE
Status: COMPLETED | OUTPATIENT
Start: 2019-07-08 | End: 2019-07-08

## 2019-07-08 RX ORDER — LABETALOL 200 MG/1
200 TABLET, FILM COATED ORAL 2 TIMES DAILY
COMMUNITY
End: 2019-07-11

## 2019-07-08 RX ORDER — MAGNESIUM SULFATE HEPTAHYDRATE 40 MG/ML
2 INJECTION, SOLUTION INTRAVENOUS
Status: COMPLETED | OUTPATIENT
Start: 2019-07-08 | End: 2019-07-08

## 2019-07-08 RX ADMIN — SODIUM CHLORIDE 1000 ML: 900 INJECTION, SOLUTION INTRAVENOUS at 18:40

## 2019-07-08 RX ADMIN — ACETAMINOPHEN 1000 MG: 10 INJECTION, SOLUTION INTRAVENOUS at 19:14

## 2019-07-08 RX ADMIN — MAGNESIUM SULFATE HEPTAHYDRATE 2 G: 40 INJECTION, SOLUTION INTRAVENOUS at 19:47

## 2019-07-08 RX ADMIN — MAGNESIUM SULFATE HEPTAHYDRATE 2 G: 40 INJECTION, SOLUTION INTRAVENOUS at 20:28

## 2019-07-08 NOTE — ED TRIAGE NOTES
Pt states, \"obgyn sent me to be checked for postpartum preeclampsia\". Pt c/o bilateral leg swelling, dizziness, and headaches     Patient delivered at [facility] on 19 @ 37 weeks via .  Patient reports she had Gestational Diabetes and Gestational HTN

## 2019-07-08 NOTE — ED PROVIDER NOTES
EMERGENCY DEPARTMENT HISTORY AND PHYSICAL EXAM    Date: 2019  Patient Name: Liliana Foley    History of Presenting Illness     Chief Complaint   Patient presents with    Post-Partum Complications         History Provided By: Patient    Liliana Foley is a 27 y.o. female with PMHX of hypertension who presents to the emergency department C/O headache, leg swelling, nausea, generalized unwell feeling. Patient is 5 days postpartum from an emergency  for breech child, contacted her OB/GYN this morning was advised to come in for evaluation for preeclampsia. Patient has a history of hypertension, as well as gestational diabetes, but states her blood pressure has not been well controlled. PCP: Yong, MD Norman    Current Facility-Administered Medications   Medication Dose Route Frequency Provider Last Rate Last Dose    magnesium sulfate 2 g/50 ml IVPB (premix or compounded)  2 g IntraVENous Q1H Rosa Clancy PA-C 50 mL/hr at 19 2 g at 19     Current Outpatient Medications   Medication Sig Dispense Refill    labetalol (NORMODYNE) 200 mg tablet Take 200 mg by mouth two (2) times a day.  cholecalciferol, vitamin D3, (VITAMIN D3) 2,000 unit tab Take 2,000 Units by mouth daily.  prenatal vit/iron fum/folic ac (PRENATAL 1+1 PO) Take 1 Tab by mouth daily. Past History     Past Medical History:  Past Medical History:   Diagnosis Date    Diabetes mellitus affecting pregnancy in third trimester 2019    Sickle cell trait syndrome [facility])        Past Surgical History:  Past Surgical History:   Procedure Laterality Date    HX CHOLECYSTECTOMY             Family History:  History reviewed. No pertinent family history. Social History:  Social History     Tobacco Use    Smoking status: Never Smoker    Smokeless tobacco: Never Used   Substance Use Topics    Alcohol use: No    Drug use: No       Allergies:   Allergies   Allergen Reactions    Amoxicillin Rash Review of Systems   Review of Systems   Constitutional: Negative for chills and fever. Respiratory: Negative for chest tightness, shortness of breath and wheezing. Cardiovascular: Positive for chest pain and leg swelling. Negative for palpitations. Gastrointestinal: Negative for abdominal pain, nausea and vomiting. Genitourinary: Positive for vaginal bleeding. Musculoskeletal: Negative for back pain. Neurological: Positive for headaches. Negative for dizziness and light-headedness. All other systems reviewed and are negative. Physical Exam     Vitals:    19 1714 19 1823 19 1900   BP: (!) 156/92 159/87 (!) 164/99   Pulse: (!) 102 91 79   Resp: 18 30 20   Temp: 98.6 °F (37 °C)     SpO2: 100% 98% 100%   Weight: 126.1 kg (278 lb)     Height: 5' 6\" (1.676 m)       Physical Exam   Nursing note and vitals reviewed. CONSTITUTIONAL: Alert, in no apparent distress; well-developed; well-nourished. HEAD:  Normocephalic, atraumatic  EYES: PERRL; EOM's intact. ENTM: Nose: no rhinorrhea; Throat: no erythema or exudate, mucous membranes moist  Neck:  No JVD, supple without lymphadenopathy  RESP: Chest clear, equal breath sounds. CV: S1 and S2 WNL; No murmurs, gallops or rubs. GI: Normal bowel sounds, abdomen soft and non-tender. No masses or organomegaly. : No costo-vertebral angle tenderness. BACK:  Non-tender  UPPER EXT:  Normal inspection  LOWER EXT: No edema, no calf tenderness. Distal pulses intact. NEURO: CN intact, reflexes 2/4 and sym, strength 5/5 and sym, sensation intact. SKIN: normal for age and stage. Well-healing  incision without evidence of infection. PSYCH:  Alert and oriented, normal affect.       Diagnostic Study Results     Labs -     Recent Results (from the past 12 hour(s))   CBC WITH AUTOMATED DIFF    Collection Time: 19  5:40 PM   Result Value Ref Range    WBC 11.0 4.6 - 13.2 K/uL    RBC 2.79 (L) 4.20 - 5.30 M/uL    HGB 8.3 (L) 12.0 - 16.0 g/dL    HCT 24.2 (L) 35.0 - 45.0 %    MCV 86.7 74.0 - 97.0 FL    MCH 29.7 24.0 - 34.0 PG    MCHC 34.3 31.0 - 37.0 g/dL    RDW 14.1 11.6 - 14.5 %    PLATELET 638 756 - 066 K/uL    MPV 8.8 (L) 9.2 - 11.8 FL    NEUTROPHILS 61 40 - 73 %    LYMPHOCYTES 27 21 - 52 %    MONOCYTES 6 3 - 10 %    EOSINOPHILS 6 (H) 0 - 5 %    BASOPHILS 0 0 - 2 %    ABS. NEUTROPHILS 6.7 1.8 - 8.0 K/UL    ABS. LYMPHOCYTES 3.0 0.9 - 3.6 K/UL    ABS. MONOCYTES 0.7 0.05 - 1.2 K/UL    ABS. EOSINOPHILS 0.7 (H) 0.0 - 0.4 K/UL    ABS. BASOPHILS 0.0 0.0 - 0.1 K/UL    DF AUTOMATED     METABOLIC PANEL, COMPREHENSIVE    Collection Time: 07/08/19  5:40 PM   Result Value Ref Range    Sodium 144 136 - 145 mmol/L    Potassium 3.5 3.5 - 5.5 mmol/L    Chloride 111 (H) 100 - 108 mmol/L    CO2 24 21 - 32 mmol/L    Anion gap 9 3.0 - 18 mmol/L    Glucose 103 (H) 74 - 99 mg/dL    BUN 3 (L) 7.0 - 18 MG/DL    Creatinine 0.77 0.6 - 1.3 MG/DL    BUN/Creatinine ratio 4 (L) 12 - 20      GFR est AA >60 >60 ml/min/1.73m2    GFR est non-AA >60 >60 ml/min/1.73m2    Calcium 8.2 (L) 8.5 - 10.1 MG/DL    Bilirubin, total 0.5 0.2 - 1.0 MG/DL    ALT (SGPT) 44 13 - 56 U/L    AST (SGOT) 41 (H) 15 - 37 U/L    Alk.  phosphatase 77 45 - 117 U/L    Protein, total 5.9 (L) 6.4 - 8.2 g/dL    Albumin 2.3 (L) 3.4 - 5.0 g/dL    Globulin 3.6 2.0 - 4.0 g/dL    A-G Ratio 0.6 (L) 0.8 - 1.7     MAGNESIUM    Collection Time: 07/08/19  5:40 PM   Result Value Ref Range    Magnesium 1.7 1.6 - 2.6 mg/dL   EKG, 12 LEAD, INITIAL    Collection Time: 07/08/19  6:12 PM   Result Value Ref Range    Ventricular Rate 86 BPM    Atrial Rate 86 BPM    P-R Interval 160 ms    QRS Duration 86 ms    Q-T Interval 342 ms    QTC Calculation (Bezet) 409 ms    Calculated P Axis 55 degrees    Calculated R Axis 53 degrees    Calculated T Axis 15 degrees    Diagnosis       Normal sinus rhythm  Normal ECG  When compared with ECG of 16-OCT-2018 23:13,  No significant change was found     URINALYSIS W/ RFLX MICROSCOPIC Collection Time: 07/08/19  6:15 PM   Result Value Ref Range    Color PINK      Appearance CLOUDY      Specific gravity 1.016 1.005 - 1.030      pH (UA) 6.0 5.0 - 8.0      Protein 100 (A) NEG mg/dL    Glucose NEGATIVE  NEG mg/dL    Ketone NEGATIVE  NEG mg/dL    Bilirubin NEGATIVE  NEG      Blood LARGE (A) NEG      Urobilinogen 1.0 0.2 - 1.0 EU/dL    Nitrites NEGATIVE  NEG      Leukocyte Esterase LARGE (A) NEG     URINE MICROSCOPIC ONLY    Collection Time: 07/08/19  6:15 PM   Result Value Ref Range    WBC 10 to 25 0 - 5 /hpf    RBC TOO NUMEROUS TO COUNT 0 - 5 /hpf    Epithelial cells 3+ 0 - 5 /lpf    Bacteria 1+ (A) NEG /hpf       Radiologic Studies -   XR CHEST PA LAT    (Results Pending)     CT Results  (Last 48 hours)    None        CXR Results  (Last 48 hours)    None          Medications given in the ED-  Medications   magnesium sulfate 2 g/50 ml IVPB (premix or compounded) (2 g IntraVENous New Bag 7/8/19 1947)   sodium chloride 0.9 % bolus infusion 1,000 mL (1,000 mL IntraVENous New Bag 7/8/19 1840)   acetaminophen (OFIRMEV) infusion 1,000 mg (0 mg IntraVENous IV Completed 7/8/19 1956)         Medical Decision Making   I am the first provider for this patient. I reviewed the vital signs, available nursing notes, past medical history, past surgical history, family history and social history. Vital Signs-Reviewed the patient's vital signs. Pulse Oximetry Analysis - 100% on RA     Cardiac Monitor:  Rate: 79 bpm  Rhythm: NSR    Records Reviewed: Old Medical Records    Procedures:  Procedures      ED Course:   5:39 PM  Initial assessment performed. The patients presenting problems have been discussed, and they are in agreement with the care plan formulated and outlined with them. I have encouraged them to ask questions as they arise throughout their visit. 6:52 PM  Reevaluated patient, states headache continues without improvement.     7:58 PM  Discussed with Dr. Meme Henson, OB/GYN, will have midwife come down and evaluate patient. 8:24 PM  Discussed plan with patients, who verbalized understanding. Pt states the headache improved with magnesium. 9:52 PM  Midwife Margarita Dickey called, pt will be admitted to L&D. Pt advised of plan. Diagnosis and Disposition       Discussion: 27 y.o. female with preeclampsia, will be admitted to labor and delivery on magnesium IV. Core Measures:  For Hospitalized Patients:    1. Hospitalization Decision Time:  The decision to hospitalize the patient was made by Sachin Weinstein PA-C, at 7:58 PM on 7/8/2019    2. Aspirin: Aspirin was not given because the patient did not present with a stroke at the time of their Emergency Department evaluation    7:58 PM Time talked to admitting MD.  Patient is being admitted to the hospital by Dr. Erlin Wang. The results of their tests and reasons for their admission have been discussed with them and/or available family. They convey agreement and understanding for the need to be admitted and for their admission diagnosis. CONDITIONS ON ADMISSION:  Sepsis is not present at the time of admission. Deep Vein Thrombosis is not present at the time of admission. Thrombosis is not present at the time of admission. Urinary Tract Infection is not present at the time of admission. Pneumonia is not present at the time of admission. MRSA is not present at the time of admission. Wound infection is not present at the time of admission. Pressure Ulcer is not present at the time of admission. CLINICAL IMPRESSION:    1. Pre-eclampsia, antepartum        Note completed by Sachin Weinstein PA-C        Please note that this dictation was completed with [employer] the computer voice recognition software. Quite often unanticipated grammatical, syntax, homophones, and other interpretive errors are inadvertently transcribed by the computer software. Please disregard these errors. Please excuse any errors that have escaped final proofreading.

## 2019-07-08 NOTE — ED NOTES
Patient with continuous LY. Aston BAEZ made aware. Orders received. Pending orders to show on EMR at this time.  Change of shift receiving RN to be made aware

## 2019-07-09 ENCOUNTER — HOSPITAL ENCOUNTER (INPATIENT)
Age: 34
LOS: 2 days | Discharge: HOME | DRG: 776 | End: 2019-07-11
Attending: OBSTETRICS & GYNECOLOGY | Admitting: OBSTETRICS & GYNECOLOGY
Payer: COMMERCIAL

## 2019-07-09 VITALS
OXYGEN SATURATION: 100 % | BODY MASS INDEX: 44.68 KG/M2 | RESPIRATION RATE: 10 BRPM | HEART RATE: 88 BPM | DIASTOLIC BLOOD PRESSURE: 76 MMHG | TEMPERATURE: 98.7 F | WEIGHT: 278 LBS | HEIGHT: 66 IN | SYSTOLIC BLOOD PRESSURE: 158 MMHG

## 2019-07-09 PROBLEM — O48.0 POST TERM PREGNANCY, DELIVERED: Status: ACTIVE | Noted: 2019-07-09

## 2019-07-09 LAB
ALBUMIN SERPL-MCNC: 2.6 G/DL (ref 3.4–5)
ALBUMIN/GLOB SERPL: 0.7 {RATIO} (ref 0.8–1.7)
ALP SERPL-CCNC: 83 U/L (ref 45–117)
ALT SERPL-CCNC: 48 U/L (ref 13–56)
ANION GAP SERPL CALC-SCNC: 8 MMOL/L (ref 3–18)
AST SERPL-CCNC: 41 U/L (ref 15–37)
ATRIAL RATE: 86 BPM
BILIRUB DIRECT SERPL-MCNC: 0.2 MG/DL (ref 0–0.2)
BILIRUB SERPL-MCNC: 0.5 MG/DL (ref 0.2–1)
BUN SERPL-MCNC: 3 MG/DL (ref 7–18)
BUN/CREAT SERPL: 4 (ref 12–20)
CALCIUM SERPL-MCNC: 8.2 MG/DL (ref 8.5–10.1)
CALCULATED P AXIS, ECG09: 55 DEGREES
CALCULATED R AXIS, ECG10: 53 DEGREES
CALCULATED T AXIS, ECG11: 15 DEGREES
CHLORIDE SERPL-SCNC: 110 MMOL/L (ref 100–108)
CO2 SERPL-SCNC: 26 MMOL/L (ref 21–32)
CREAT SERPL-MCNC: 0.74 MG/DL (ref 0.6–1.3)
DIAGNOSIS, 93000: NORMAL
ERYTHROCYTE [DISTWIDTH] IN BLOOD BY AUTOMATED COUNT: 14.1 % (ref 11.6–14.5)
GLOBULIN SER CALC-MCNC: 3.8 G/DL (ref 2–4)
GLUCOSE SERPL-MCNC: 78 MG/DL (ref 74–99)
HCT VFR BLD AUTO: 26.8 % (ref 35–45)
HGB BLD-MCNC: 9 G/DL (ref 12–16)
MAGNESIUM SERPL-MCNC: 2.5 MG/DL (ref 1.6–2.6)
MAGNESIUM SERPL-MCNC: 3.9 MG/DL (ref 1.6–2.6)
MAGNESIUM SERPL-MCNC: 4.6 MG/DL (ref 1.6–2.6)
MAGNESIUM SERPL-MCNC: 4.9 MG/DL (ref 1.6–2.6)
MCH RBC QN AUTO: 29.4 PG (ref 24–34)
MCHC RBC AUTO-ENTMCNC: 33.6 G/DL (ref 31–37)
MCV RBC AUTO: 87.6 FL (ref 74–97)
P-R INTERVAL, ECG05: 160 MS
PLATELET # BLD AUTO: 316 K/UL (ref 135–420)
PMV BLD AUTO: 9 FL (ref 9.2–11.8)
POTASSIUM SERPL-SCNC: 3.6 MMOL/L (ref 3.5–5.5)
PROT SERPL-MCNC: 6.4 G/DL (ref 6.4–8.2)
Q-T INTERVAL, ECG07: 342 MS
QRS DURATION, ECG06: 86 MS
QTC CALCULATION (BEZET), ECG08: 409 MS
RBC # BLD AUTO: 3.06 M/UL (ref 4.2–5.3)
SODIUM SERPL-SCNC: 144 MMOL/L (ref 136–145)
URATE SERPL-MCNC: 6.9 MG/DL (ref 2.6–7.2)
VENTRICULAR RATE, ECG03: 86 BPM
WBC # BLD AUTO: 13.1 K/UL (ref 4.6–13.2)

## 2019-07-09 PROCEDURE — 74011250637 HC RX REV CODE- 250/637: Performed by: ADVANCED PRACTICE MIDWIFE

## 2019-07-09 PROCEDURE — 74011250637 HC RX REV CODE- 250/637: Performed by: MIDWIFE

## 2019-07-09 PROCEDURE — 80076 HEPATIC FUNCTION PANEL: CPT

## 2019-07-09 PROCEDURE — 65270000029 HC RM PRIVATE

## 2019-07-09 PROCEDURE — 96360 HYDRATION IV INFUSION INIT: CPT

## 2019-07-09 PROCEDURE — 36415 COLL VENOUS BLD VENIPUNCTURE: CPT

## 2019-07-09 PROCEDURE — 74011250637 HC RX REV CODE- 250/637: Performed by: OBSTETRICS & GYNECOLOGY

## 2019-07-09 PROCEDURE — 74011250636 HC RX REV CODE- 250/636

## 2019-07-09 PROCEDURE — 84550 ASSAY OF BLOOD/URIC ACID: CPT

## 2019-07-09 PROCEDURE — 85027 COMPLETE CBC AUTOMATED: CPT

## 2019-07-09 PROCEDURE — 80048 BASIC METABOLIC PNL TOTAL CA: CPT

## 2019-07-09 PROCEDURE — 83735 ASSAY OF MAGNESIUM: CPT

## 2019-07-09 PROCEDURE — 74011250636 HC RX REV CODE- 250/636: Performed by: MIDWIFE

## 2019-07-09 PROCEDURE — 74011250636 HC RX REV CODE- 250/636: Performed by: OBSTETRICS & GYNECOLOGY

## 2019-07-09 RX ORDER — MAGNESIUM SULFATE HEPTAHYDRATE 40 MG/ML
INJECTION, SOLUTION INTRAVENOUS
Status: COMPLETED
Start: 2019-07-09 | End: 2019-07-09

## 2019-07-09 RX ORDER — SODIUM CHLORIDE, SODIUM LACTATE, POTASSIUM CHLORIDE, CALCIUM CHLORIDE 600; 310; 30; 20 MG/100ML; MG/100ML; MG/100ML; MG/100ML
75 INJECTION, SOLUTION INTRAVENOUS CONTINUOUS
Status: DISCONTINUED | OUTPATIENT
Start: 2019-07-09 | End: 2019-07-10

## 2019-07-09 RX ORDER — LORAZEPAM 2 MG/ML
2 INJECTION INTRAMUSCULAR
Status: DISCONTINUED | OUTPATIENT
Start: 2019-07-09 | End: 2019-07-11 | Stop reason: HOSPADM

## 2019-07-09 RX ORDER — LABETALOL 200 MG/1
200 TABLET, FILM COATED ORAL 2 TIMES DAILY
Status: DISCONTINUED | OUTPATIENT
Start: 2019-07-09 | End: 2019-07-09

## 2019-07-09 RX ORDER — DIAZEPAM 10 MG/2ML
5 INJECTION INTRAMUSCULAR
Status: DISCONTINUED | OUTPATIENT
Start: 2019-07-09 | End: 2019-07-11 | Stop reason: HOSPADM

## 2019-07-09 RX ORDER — CALCIUM GLUCONATE 94 MG/ML
1 INJECTION, SOLUTION INTRAVENOUS AS NEEDED
Status: DISCONTINUED | OUTPATIENT
Start: 2019-07-09 | End: 2019-07-11 | Stop reason: HOSPADM

## 2019-07-09 RX ORDER — MAGNESIUM SULFATE HEPTAHYDRATE 40 MG/ML
2 INJECTION, SOLUTION INTRAVENOUS CONTINUOUS
Status: DISCONTINUED | OUTPATIENT
Start: 2019-07-09 | End: 2019-07-10

## 2019-07-09 RX ORDER — IBUPROFEN 400 MG/1
800 TABLET ORAL
Status: DISCONTINUED | OUTPATIENT
Start: 2019-07-09 | End: 2019-07-11 | Stop reason: HOSPADM

## 2019-07-09 RX ORDER — LABETALOL 200 MG/1
200 TABLET, FILM COATED ORAL 3 TIMES DAILY
Status: DISCONTINUED | OUTPATIENT
Start: 2019-07-09 | End: 2019-07-11 | Stop reason: HOSPADM

## 2019-07-09 RX ORDER — LEVETIRACETAM 5 MG/ML
500 INJECTION INTRAVASCULAR
Status: DISCONTINUED | OUTPATIENT
Start: 2019-07-09 | End: 2019-07-11 | Stop reason: HOSPADM

## 2019-07-09 RX ORDER — BUTALBITAL, ACETAMINOPHEN AND CAFFEINE 50; 325; 40 MG/1; MG/1; MG/1
1 TABLET ORAL
Status: DISCONTINUED | OUTPATIENT
Start: 2019-07-09 | End: 2019-07-11 | Stop reason: HOSPADM

## 2019-07-09 RX ADMIN — MAGNESIUM SULFATE HEPTAHYDRATE 2 G/HR: 40 INJECTION, SOLUTION INTRAVENOUS at 01:25

## 2019-07-09 RX ADMIN — LABETALOL HCL 200 MG: 200 TABLET, FILM COATED ORAL at 15:47

## 2019-07-09 RX ADMIN — LABETALOL HYDROCHLORIDE 200 MG: 200 TABLET, FILM COATED ORAL at 03:19

## 2019-07-09 RX ADMIN — LABETALOL HYDROCHLORIDE 200 MG: 200 TABLET, FILM COATED ORAL at 09:58

## 2019-07-09 RX ADMIN — IBUPROFEN 800 MG: 400 TABLET ORAL at 23:54

## 2019-07-09 RX ADMIN — LABETALOL HCL 200 MG: 200 TABLET, FILM COATED ORAL at 22:06

## 2019-07-09 RX ADMIN — IBUPROFEN 800 MG: 400 TABLET ORAL at 17:10

## 2019-07-09 RX ADMIN — SODIUM CHLORIDE, SODIUM LACTATE, POTASSIUM CHLORIDE, AND CALCIUM CHLORIDE 75 ML/HR: 600; 310; 30; 20 INJECTION, SOLUTION INTRAVENOUS at 14:52

## 2019-07-09 RX ADMIN — MAGNESIUM SULFATE HEPTAHYDRATE 2 G/HR: 40 INJECTION, SOLUTION INTRAVENOUS at 19:51

## 2019-07-09 RX ADMIN — IBUPROFEN 800 MG: 400 TABLET ORAL at 05:30

## 2019-07-09 NOTE — PROGRESS NOTES
Bedside and Verbal shift change report given to MARILEE Chau RN (oncoming nurse) by Moose Duarte. LOGAN Alvarez (offgoing nurse). Report included the following information SBAR, Kardex, Procedure Summary, Intake/Output, MAR and Recent Results. 0730 Assessment completed. 0830 Pt c/o shortness of breath. Pulse Ox applied. 0845 JOIE Armijo CNM on unit informed her of pt complain. 7398 JOIE Armijo CNM at bedside. Assessment completed, lungs clear to auscultation. Will continue to monitor. 1400 JOIE Armijo called with orders to increase Labetalol to TID.     1910 Bedside and Verbal shift change report given to LOGAN Pena  (oncoming nurse) by Leticia Palafox (offgoing nurse). Report included the following information SBAR, Kardex, Procedure Summary, Intake/Output, MAR and Recent Results.

## 2019-07-09 NOTE — H&P
History & Physical    Name: Teofilo Kehr MRN: [id number]  SSN: [id number]    YOB: 1985  Age: 27 y.o. Sex: female        Subjective:     Estimated Date of Delivery: 19  OB History        4    Para   2    Term   2            AB   1    Living   2       SAB   1    TAB        Ectopic        Molar        Multiple        Live Births   2                  Ms. Blima Holter is admitted from the ER postpartum day 6 s/p PLTCS and BTL for Postpartum Pre-eclampsia. . Prenatal course was complicated by pregnancy induced hypertension. On admission she reported H/A and pedal edema. Please see prenatal records for details. Allergies   Allergen Reactions    Amoxicillin Rash       Objective:     Vitals:  Vitals:    19 0702 19 0703 19 0730 19 0800   BP: 184/84 141/84 147/83 (!) 154/96   Pulse: 89 88 89 85   Resp:   16    Temp:   98.4 °F (36.9 °C)         Physical Exam:  Patient without distress. Heart: Regular rate and rhythm  Lung: clear to auscultation throughout lung fields, no wheezes, no rales, no rhonchi and normal respiratory effort; denies SOB, SATs % on RA  Abdomen: soft, nontender  Fundus: soft and non tender  Lower Extremities: DTR +2, Edema BLE +1, absent clonus  Denies H/A    Prenatal Labs:   No results found for: RUBELLAEXT, GRBSEXT, HBSAGEXT, HIVEXT, RPREXT, GONNOEXT, CHLAMEXT      Assessment/Plan:     Plan:   Admit for 24 Hours of PP Magnesium Sulfate Therapy and 24 hour urine per Dr Doug Albarran. Will D/C at 0125 on 7/10/19  Continue Labetalol 200mg BID, adjust as needed for elevated BP  SCDs on while in bed  Strict I/Os  Denies H/A at this time.       Signed By:  Marie Alfonso CNM     2019

## 2019-07-09 NOTE — PROGRESS NOTES
3984 Patient arrived on unit wheelchair accompanied by ED tech, patient to be admitted for postpartum observation, patient not on magnesium drip at this time. Taken to room 7  0050 Dr. Marietta Jordan on unit, clarified orders. Orders for 24 hour urine and magnesium for 24 hours. 0200 24 hour urine started. 0400 Consulted with CHIRAG Boyd concerning elevated BP, no new orders at this time, to continue to monitor, only to treat when in critical range per Dr. Marietta Jordan  0720 Bedside and Verbal shift change report given to MARILEE Arthur RN (oncoming nurse) by Issa Forrest RN (offgoing nurse). Report included the following information SBAR, Intake/Output, MAR and Recent Results.

## 2019-07-10 LAB
APPEARANCE UR: CLEAR
BACTERIA URNS QL MICRO: NEGATIVE /HPF
BILIRUB UR QL: NEGATIVE
COLLECT DURATION TIME UR: 24 HR
COLOR UR: YELLOW
EPITH CASTS URNS QL MICRO: NEGATIVE /LPF (ref 0–5)
GLUCOSE UR STRIP.AUTO-MCNC: NEGATIVE MG/DL
HGB UR QL STRIP: ABNORMAL
KETONES UR QL STRIP.AUTO: NEGATIVE MG/DL
LEUKOCYTE ESTERASE UR QL STRIP.AUTO: NEGATIVE
MAGNESIUM SERPL-MCNC: 4.2 MG/DL (ref 1.6–2.6)
MUCOUS THREADS URNS QL MICRO: NEGATIVE /LPF
NITRITE UR QL STRIP.AUTO: NEGATIVE
PH UR STRIP: 6.5 [PH] (ref 5–8)
PROT 24H UR-MRATE: 1019 MG/24HR
PROT UR STRIP-MCNC: NEGATIVE MG/DL
RBC #/AREA URNS HPF: NORMAL /HPF (ref 0–5)
SP GR UR REFRACTOMETRY: 1.01 (ref 1–1.03)
SPECIMEN VOL ?TM UR: 7840 ML
UROBILINOGEN UR QL STRIP.AUTO: 0.2 EU/DL (ref 0.2–1)
WBC URNS QL MICRO: NEGATIVE /HPF (ref 0–5)

## 2019-07-10 PROCEDURE — 74011250637 HC RX REV CODE- 250/637: Performed by: MIDWIFE

## 2019-07-10 PROCEDURE — 74011250637 HC RX REV CODE- 250/637: Performed by: ADVANCED PRACTICE MIDWIFE

## 2019-07-10 PROCEDURE — 81001 URINALYSIS AUTO W/SCOPE: CPT

## 2019-07-10 PROCEDURE — 83735 ASSAY OF MAGNESIUM: CPT

## 2019-07-10 PROCEDURE — 65660000000 HC RM CCU STEPDOWN

## 2019-07-10 PROCEDURE — 84156 ASSAY OF PROTEIN URINE: CPT

## 2019-07-10 PROCEDURE — 36415 COLL VENOUS BLD VENIPUNCTURE: CPT

## 2019-07-10 RX ORDER — GUAIFENESIN 600 MG/1
600 TABLET, EXTENDED RELEASE ORAL EVERY 12 HOURS
Status: DISCONTINUED | OUTPATIENT
Start: 2019-07-10 | End: 2019-07-11 | Stop reason: HOSPADM

## 2019-07-10 RX ADMIN — LABETALOL HCL 200 MG: 200 TABLET, FILM COATED ORAL at 21:09

## 2019-07-10 RX ADMIN — IBUPROFEN 800 MG: 400 TABLET ORAL at 21:20

## 2019-07-10 RX ADMIN — LABETALOL HCL 200 MG: 200 TABLET, FILM COATED ORAL at 08:41

## 2019-07-10 RX ADMIN — LABETALOL HCL 200 MG: 200 TABLET, FILM COATED ORAL at 16:04

## 2019-07-10 RX ADMIN — BUTALBITAL, ACETAMINOPHEN AND CAFFEINE 1 TABLET: 50; 325; 40 TABLET ORAL at 17:32

## 2019-07-10 RX ADMIN — IBUPROFEN 800 MG: 400 TABLET ORAL at 08:44

## 2019-07-10 RX ADMIN — GUAIFENESIN 600 MG: 600 TABLET, EXTENDED RELEASE ORAL at 21:09

## 2019-07-10 RX ADMIN — GUAIFENESIN 600 MG: 600 TABLET, EXTENDED RELEASE ORAL at 08:41

## 2019-07-10 NOTE — PROGRESS NOTES
S- 28 y/o Female S/P C/S+BTL at [facility], PPD7, readmitted for Postpartum Pre-eclampsia and Mag Sulfate Therapy. Patient reports feeling well today. O- Patient alert, awake, NOD   DTRs+1, +1 Bilat pedal edema, absent clonus, denies headache, visual changes, RUQ pain, Lungs CTA bilat,  Pulse ox % on RA     A- Postpartum Pre-eclampsia, stable    P- S/P Mag therapy. Monitor Bps throughout the day, Labetalol dose adjusted several times yesterday. If BP remains stable, plan to d/c home this evening on Labetalol, with BP F/U in the office in 3 days.         POC discussed with Dr Guy Garcia OB/GYN

## 2019-07-10 NOTE — PROGRESS NOTES
1910: Verbal SBAR report received from Jez Holm RN Assumed care of pt at this time. 2045: Reviewed BP's with CNM, she also spoke with attending MD however no further orders or changes to her meds at this time. 0128: Magnesium infusion stopped. 0210: 24 hr urine collection completed and sent to lab. 0230: Reviewed bp's with CNM, no new orders at this time. 0630: Pt stable at this time. 0710: Verbal SBAR report given to Madelin Schmitt RN. Reliquished care of pt at this time.

## 2019-07-10 NOTE — PROGRESS NOTES
0720 Bedside and Verbal shift change report given to Enos Lennox RN (oncoming nurse) by Claudia Dodson RN (offgoing nurse). Report included the following information SBAR, Kardex, MAR and Recent Results. 5448 TRANSFER - OUT REPORT:    Verbal report given to Hodan Sweet RN (name) on [facility]  being transferred to [facility] (unit) for routine progression of care       Report consisted of patients Situation, Background, Assessment and   Recommendations(SBAR). Information from the following report(s) SBAR, Kardex, [facility] and Recent Results was reviewed with the receiving nurse. Opportunity for questions and clarification was provided.       Patient transported with:   Registered Nurse to room 347 at 8623

## 2019-07-10 NOTE — PROGRESS NOTES
Progress Note    Patient: Familia Cutler MRN: [id number]  SSN: [id number]    YOB: 1985  Age: 27 y.o. Sex: female      Subjective:     Postpartum Day: 6     Delivery:  delivery    The patient feels tired. The patient denies HA, vision changes, epigastric pain, chest pain or SOB. She is currently on postpartum Magnesium Sulfate IV infusing at 2 gm/hr. Urinary output is adequate. Voiding spontaneous. The patient is ambulating to bathroom and SCD's are in use while in bed. The patient is tolerating a normal diet and is passing flatus. Objective:      Patient Vitals for the past 8 hrs:   BP Temp Pulse Resp   19 1900 140/86 -- 90 --   19 1830 -- -- -- 16   19 1630 151/86 -- 91 --   19 1600 (!) 158/91 -- 91 --   19 1530 144/84 -- 90 --   19 1500 164/88 -- 95 --   19 1430 159/90 98.2 °F (36.8 °C) 97 16   19 1400 160/87 -- 92 --   19 1330 156/85 98.3 °F (36.8 °C) 98 16   19 1300 158/90 -- 95 --   19 1230 147/80 -- 91 --     General:    alert, cooperative, no distress   Bowel Sounds:  active   Lochia:  appropriate   Uterine Fundus:   firm   Fundus Location:  -3   Incision:  no significant drainage, no dehiscence, no significant erythema, Derma bond is intact. DVT Evaluation:  No evidence of DVT seen on physical exam.  Negative Sallie's sign. No cords or calf tenderness. Calf/Ankle +3 edema is present   1+ Reflexes to lower extremities     Lab/Data Review: All lab results for the last 24 hours reviewed. Assessment:     Status post: Postpartum  and BTL delivery, day 6, complicated by Postpartum Pre-eclampsia. Plan:     Plan:   Continue current management. PP IV Magnesium Sulfate therapy and 24 hour urine in progress: Magnesium Sulfate will D/C tomorrow at 0125, 24 hour urine complete at 0200. Blood pressures remain elevated, Dr. Villareal Prom consulted, will continue Labetalol 200mg TID.   Monitor blood pressures and for Pre-Eclampsia s/sx.   Strict I/Os

## 2019-07-10 NOTE — PROGRESS NOTES
Blood pressures stable at this time. Patient seems to be recovering well. Labetalol TID for blood pressure management.

## 2019-07-10 NOTE — PROGRESS NOTES
5105:  Received telephone report from Cruzito Lockwood RN in [facility], awaiting patient arrival to unit. 3306:  Patient arrived to unit with Cruzito Lockwood RN from [facility] via wheelchair. Bedside skin assessment performed with Gema Phillips RN, lower pelvic incision noted. Clean, dry, and open to air. No signs of swelling, redness, discharge, or odor. Patient oriented to room, bed, television, telephone, bathroom, and call bell. Patient denies any pain or discomfort at the time. Whiteboard updated, bed at the lowest position with call bell within reach. 1652:  Spoke with Midwife Jose Harry about BP and status of discharge, told that patient could not be discharged per provider until after 24 hours. Provider Will come to unit to speak with patient. Bedside and Verbal shift change report given to Fran Harrison RN (oncoming nurse) by Haritha Benjamin RN   (offgoing nurse).  Report included the following information SBAR, Kardex, ED Summary, Procedure Summary, Intake/Output, MAR, Med Rec Status, Cardiac Rhythm SR, Alarm Parameters  and Pre Procedure Checklist.

## 2019-07-11 VITALS
HEART RATE: 71 BPM | TEMPERATURE: 98.6 F | WEIGHT: 268 LBS | SYSTOLIC BLOOD PRESSURE: 139 MMHG | DIASTOLIC BLOOD PRESSURE: 59 MMHG | OXYGEN SATURATION: 98 % | RESPIRATION RATE: 18 BRPM | BODY MASS INDEX: 43.26 KG/M2

## 2019-07-11 PROCEDURE — 74011250637 HC RX REV CODE- 250/637: Performed by: MIDWIFE

## 2019-07-11 RX ORDER — LABETALOL 200 MG/1
200 TABLET, FILM COATED ORAL 3 TIMES DAILY
Qty: 90 TAB | Refills: 2 | Status: SHIPPED | OUTPATIENT
Start: 2019-07-11

## 2019-07-11 RX ADMIN — LABETALOL HCL 200 MG: 200 TABLET, FILM COATED ORAL at 10:31

## 2019-07-11 RX ADMIN — GUAIFENESIN 600 MG: 600 TABLET, EXTENDED RELEASE ORAL at 10:31

## 2019-07-11 NOTE — PROGRESS NOTES
DC Plan: Discharge home with MD follow up, family assistance    Chart reviewed. Pt admitted by MD Kasie Harding for Postpartum Pre-eclampsia. Discharge order noted. This CM spoke with pt on phone. Pt states her  will drive home at discharge. States she is independent. Pt reports her PCP is MD Marie Wood. States she sees MD Kasie Harding or MD Yissel Thayer. Pt plans to make own follow up appts. No dc concerns identified. CM will cont to be available for transition of care needs. Reason for Admission:   Postpartum Pre-eclampsia    RRAT Score:    0 low                 Plan for utilizing home health:      None pt independent                    Current Advanced Directive/Advance Care Plan:  Not on file                         Transition of Care Plan:         MD follow up             Care Management Interventions  PCP Verified by CM: Yes(PCP Marie Wood)  Mode of Transport at Discharge:  Other (see comment)()  Transition of Care Consult (CM Consult): Discharge Planning  Current Support Network: Lives with Spouse  Plan discussed with Pt/Family/Caregiver: Yes  Discharge Location  Discharge Placement: Home with family assistance

## 2019-07-11 NOTE — DISCHARGE INSTRUCTIONS
Patient Education        Learning About Preeclampsia After Childbirth  What is preeclampsia? A woman with preeclampsia has blood pressure that is higher than usual. She may also have other serious symptoms. Preeclampsia can be dangerous. When it is severe, it can cause seizures (eclampsia) or liver or kidney damage. When the liver is affected, some women get HELLP syndrome, a blood-clotting and bleeding problem. HELLP can come on quickly and can be deadly. This is why your doctor checks you and your baby often. Preeclampsia usually occurs after 20 weeks of pregnancy. Most often, it starts near the end of pregnancy and goes away after childbirth. But symptoms may last a few weeks or more and can get worse after delivery. Rarely, symptoms of preeclampsia don't show up until days or even weeks after childbirth. What are the symptoms? Mild preeclampsia usually doesn't cause symptoms. But preeclampsia can cause rapid weight gain and sudden swelling of the hands and face. Severe preeclampsia does cause symptoms. It can cause a very bad headache and trouble seeing and breathing. It also can cause belly pain. You may also urinate less than usual.  If you have new preeclampsia symptoms after you go home from the hospital, call your doctor right away. What can you expect after you have had preeclampsia? In the hospital  After the baby and the placenta are delivered, preeclampsia usually starts to improve. Most women get better in the first few days after childbirth. After having preeclampsia, you still have a risk of seizures for a day or more after childbirth. (Very rarely, seizures happen later on.) So your doctor may have you take magnesium sulfate for a day or more to prevent seizures. You may also take medicine to lower your blood pressure. When you go home  Your blood pressure will most likely return to normal a few days after delivery.  Your doctor will want to check your blood pressure sometime in the first week after you leave the hospital.  Some women still have high blood pressure 6 weeks after childbirth. But most return to normal levels over the long term. · Take and record your blood pressure at home if your doctor tells you to. ? Learn the importance of the two measures of blood pressure (such as 120 over 80, or 120/80). The first number is the systolic pressure. This is the force of blood on the artery walls as the heart pumps. The second number is the diastolic pressure. This is the force of blood on the artery walls between heartbeats, when the heart is at rest. You have a choice of monitors to use. § Manual monitor: You pump up the cuff and use a stethoscope to listen for your pulse. § Electronic monitor: The cuff inflates, and a gauge shows your pulse rate. ? To take your blood pressure:  § Ask your doctor to check your blood pressure monitor to be sure that it is accurate and that the cuff fits you. Also ask your doctor to watch you use it, to make sure that you are using it right. § You should not eat, use tobacco products, or use medicine known to raise blood pressure (such as some nasal decongestant sprays) before you take your blood pressure. § Avoid taking your blood pressure if you have just exercised or are nervous or upset. Rest at least 15 minutes before you take your blood pressure. · Be safe with medicines. If you take medicine, take it exactly as prescribed. Call your doctor if you think you are having a problem with your medicine. · Do not smoke. Quitting smoking will help lower your blood pressure and improve your baby's growth and health. If you need help quitting, talk to your doctor about stop-smoking programs and medicines. These can increase your chances of quitting for good. · Eat a balanced and healthy diet that has lots of fruits and vegetables.   Long-term health  After you have had preeclampsia, you have a higher-than-average risk of heart disease, stroke, and kidney disease. This may be because the same things that cause preeclampsia also cause heart and kidney disease. To protect your health, work with your doctor on living a heart-healthy lifestyle and getting the checkups you need. Your doctor may also want you to check your blood pressure at home. Follow-up care is a key part of your treatment and safety. Be sure to make and go to all appointments, and call your doctor if you are having problems. It's also a good idea to know your test results and keep a list of the medicines you take. Where can you learn more? Go to http://milan-germain.info/. Enter X556 in the search box to learn more about \"Learning About Preeclampsia After Childbirth. \"  Current as of: September 5, 2018  Content Version: 11.9  © 2435-2663 [employer]. Care instructions adapted under license by [employer] (which disclaims liability or warranty for this information). If you have questions about a medical condition or this instruction, always ask your healthcare professional. [address] any warranty or liability for your use of this information.

## 2019-07-11 NOTE — PROGRESS NOTES
0720:  Assumed care for patient, received bedside report from Birgit Wills RN. Patient quietly lying in bed with no complaints of pain or discomfort at the time. Patient only concern is \"when can I leave? \"  Informed patient there are discharge orders, but have to do assessment and read chart. Whiteboard updated, bed at the lowest position with call bell within reach.

## 2019-07-11 NOTE — DISCHARGE SUMMARY
Obstetrical Discharge Summary     Name: Anup Paz MRN: [id number]  SSN: [id number]    YOB: 1985  Age: 27 y.o. Sex: female      Allergies: Amoxicillin    Admit Date: 7/9/2019    Discharge Date: 7/11/2019     Admitting Physician: Sherron Shepherd MD     Attending Physician:  Osorio Britton MD     * Admission Diagnoses: Post term pregnancy, delivered [O48.0]    * Discharge Diagnoses: This patient has no babies on file. Additional Diagnoses:   Problem List as of 7/11/2019 Date Reviewed: 4/25/2019          Codes Class Noted - Resolved    Post term pregnancy, delivered ICD-10-CM: O48.0  ICD-9-CM: 645.11  7/9/2019 - Present        Preeclampsia ICD-10-CM: O14.90  ICD-9-CM: 642.40  7/8/2019 - Present        34 weeks gestation of pregnancy ICD-10-CM: Z3A.34  ICD-9-CM: V22.2  6/18/2019 - Present        Diabetes mellitus affecting pregnancy in third trimester ICD-10-CM: O24.913  ICD-9-CM: 648.03, 250.00  6/18/2019 - Present        27 weeks gestation of pregnancy ICD-10-CM: Z3A.27  ICD-9-CM: V22.2  4/25/2019 - Present        Premature uterine contractions causing threatened premature labor, second trimester ICD-10-CM: O47.02  ICD-9-CM: 644.03  4/25/2019 - Present        Elevated blood pressure affecting pregnancy in second trimester, antepartum ICD-10-CM: O16.2  ICD-9-CM: 642.33  4/25/2019 - Present        RESOLVED: IUP (intrauterine pregnancy), incidental ICD-10-CM: Z34.90  ICD-9-CM: V22.2  4/24/2019 - 4/25/2019             Lab Results   Component Value Date/Time    ABO/Rh(D) [facility] POSITIVE 04/25/2019 12:45 AM       Immunization(s):   There is no immunization history on file for this patient. * Procedures: n/a  * No surgery found *           * Discharge Condition: stable    * Hospital Course: Postpartum course was complicated by hypertension, which added 2 days to the patient's length of stay.       * Disposition: Home    Discharge Medications:   Current Discharge Medication List      CONTINUE these medications which have CHANGED    Details   labetalol (NORMODYNE) 200 mg tablet Take 1 Tab by mouth three (3) times daily. Qty: 90 Tab, Refills: 2         CONTINUE these medications which have NOT CHANGED    Details   prenatal vit/iron fum/folic ac (PRENATAL 1+1 PO) Take 1 Tab by mouth daily. cholecalciferol, vitamin D3, (VITAMIN D3) 2,000 unit tab Take 2,000 Units by mouth daily. * Follow-up Care/Patient Instructions: Activity: Activity as tolerated, No lifting, Driving, or Strenuous exercise for 2, No sex for 6 weeks and No driving while on analgesics  Diet: Regular Diet  Wound Care: As directed    Follow-up Information     Follow up With Specialties Details Why Contact Info    Other, Norman, MD    Patient can only remember the practice name and not the physician                        .

## 2019-07-11 NOTE — PROGRESS NOTES
[facility] care from Chalino Uriarte RN. Pt engorged and needs a breast pump. Will contact nursing supervisor. 2010 Breast pump given to pt.    2340 BP was elevated. Spoke to provider and made her aware. No further orders received. Will continue to monitor. Shift uneventful.    0720 Bedside and Verbal shift change report given to Clint Goodpasture, RN (oncoming nurse) by Risa Steve RN   (offgoing nurse). Report included the following information SBAR, Kardex, Intake/Output, MAR, Recent Results and Med Rec Status.

## 2019-07-11 NOTE — PROGRESS NOTES
Problem: Pain  Goal: *Control of Pain  Outcome: Progressing Towards Goal  Goal: *PALLIATIVE CARE:  Alleviation of Pain  Outcome: Progressing Towards Goal     Problem: Hypertensive Disorders of Pregnancy Care Plan (Gestational HTN, Preeclampsia, HELLP syndrome, Eclampsia, Chronic HTN, Superimposed Preeclamsia)  Goal: *Blood pressure within specified parameters  Outcome: Progressing Towards Goal  Goal: *Fluid volume balance  Outcome: Progressing Towards Goal  Goal: *Labs within defined limits  Outcome: Progressing Towards Goal  Goal: *Remains free of seizures  Outcome: Progressing Towards Goal

## 2020-01-16 NOTE — PROGRESS NOTES
36 CHADWICK Dawson CNM aware of patient's arrival and no assessment done at this time. 0028 Patient arrives to unit with complaints of contractions every 3-5 minutes since 1830. Patient states she has been trying to change positions, stay off her feet and hydrate as much as she can, but contractions have remained the same. Patient Kristen Lima discussed with CHADWICK Dawson CNM. Orders received for IV, fluids, PIH labs. If contractions slow down, PIH labs within normal and blood pressures WDL patient may be discharged. 7722 Patient states contractions have decreased in intensity. Headache has resolved. 1915 I have reviewed discharge instructions with the patient. The patient verbalized understanding. Patient instructed to call office for follow up. Patient verbalized understanding. Patient ambulated with steady gait off unit.
(3) adequate

## 2020-08-12 ENCOUNTER — HOSPITAL ENCOUNTER (EMERGENCY)
Age: 35
Discharge: HOME | End: 2020-08-12
Attending: EMERGENCY MEDICINE
Payer: COMMERCIAL

## 2020-08-12 ENCOUNTER — APPOINTMENT (OUTPATIENT)
Dept: VASCULAR SURGERY | Age: 35
End: 2020-08-12
Attending: EMERGENCY MEDICINE
Payer: COMMERCIAL

## 2020-08-12 VITALS
BODY MASS INDEX: 43.39 KG/M2 | WEIGHT: 270 LBS | HEIGHT: 66 IN | HEART RATE: 96 BPM | OXYGEN SATURATION: 100 % | DIASTOLIC BLOOD PRESSURE: 92 MMHG | TEMPERATURE: 98.7 F | RESPIRATION RATE: 18 BRPM | SYSTOLIC BLOOD PRESSURE: 142 MMHG

## 2020-08-12 DIAGNOSIS — R60.0 LEG EDEMA, LEFT: Primary | ICD-10-CM

## 2020-08-12 PROCEDURE — 99283 EMERGENCY DEPT VISIT LOW MDM: CPT

## 2020-08-12 PROCEDURE — 93971 EXTREMITY STUDY: CPT

## 2020-08-12 RX ORDER — IBUPROFEN 600 MG/1
600 TABLET ORAL
Qty: 20 TAB | Refills: 0 | Status: SHIPPED | OUTPATIENT
Start: 2020-08-12 | End: 2020-08-12

## 2020-08-12 RX ORDER — IBUPROFEN 600 MG/1
600 TABLET ORAL
Qty: 20 TAB | Refills: 0 | Status: SHIPPED | OUTPATIENT
Start: 2020-08-12

## 2020-08-12 RX ORDER — CYCLOBENZAPRINE HCL 10 MG
10 TABLET ORAL
Qty: 12 TAB | Refills: 0 | Status: SHIPPED | OUTPATIENT
Start: 2020-08-12

## 2020-08-12 RX ORDER — CYCLOBENZAPRINE HCL 10 MG
10 TABLET ORAL
Qty: 12 TAB | Refills: 0 | Status: SHIPPED | OUTPATIENT
Start: 2020-08-12 | End: 2020-08-12

## 2020-08-12 NOTE — ED PROVIDER NOTES
EMERGENCY DEPARTMENT HISTORY AND PHYSICAL EXAM    Date: 8/12/2020  Patient Name: Anneliese Dawkins    History of Presenting Illness     Chief Complaint   Patient presents with    Leg Pain    Ankle swelling         History Provided By: Patient    7:01 PM  Anneliese Dawkins is a 38 y.o. female with no significant PMHX who presents to the emergency department C/O left leg swelling. Patient states she first noticed the swelling about a week ago. She states since then she started having pain intermittently and today started to have constant pain that radiates from her knee down to her heel and back from her heel up to her knee. She denies any falls or injuries. She denies any chest pain, shortness of breath, fever, cough, sick contacts, recent travel. She does not smoke or take any hormones or birth control. No family history of blood clots. PCP: Norman Beach MD    Current Outpatient Medications   Medication Sig Dispense Refill    ibuprofen (MOTRIN) 600 mg tablet Take 1 Tab by mouth every six (6) hours as needed for Pain. 20 Tab 0    cyclobenzaprine (FLEXERIL) 10 mg tablet Take 1 Tab by mouth three (3) times daily as needed for Muscle Spasm(s). 12 Tab 0    labetalol (NORMODYNE) 200 mg tablet Take 1 Tab by mouth three (3) times daily. 90 Tab 2    cholecalciferol, vitamin D3, (VITAMIN D3) 2,000 unit tab Take 2,000 Units by mouth daily.  prenatal vit/iron fum/folic ac (PRENATAL 1+1 PO) Take 1 Tab by mouth daily. Past History     Past Medical History:  Past Medical History:   Diagnosis Date    Diabetes mellitus affecting pregnancy in third trimester 6/18/2019    Preeclampsia 7/8/2019    Sickle cell trait syndrome [facility])        Past Surgical History:  Past Surgical History:   Procedure Laterality Date    HX CHOLECYSTECTOMY      2009       Family History:  No family history on file.     Social History:  Social History     Tobacco Use    Smoking status: Never Smoker    Smokeless tobacco: Never Used Substance Use Topics    Alcohol use: No    Drug use: No       Allergies: Allergies   Allergen Reactions    Amoxicillin Rash         Review of Systems   Review of Systems   Constitutional: Negative for fever. Respiratory: Negative for shortness of breath. Cardiovascular: Positive for leg swelling. Negative for chest pain. Musculoskeletal: Positive for arthralgias and myalgias. All other systems reviewed and are negative. Physical Exam     Vitals:    08/12/20 1831   BP: (!) 150/95   Pulse: 97   Resp: 17   Temp: 98.7 °F (37.1 °C)   SpO2: 100%   Weight: 122.5 kg (270 lb)   Height: 5' 6\" (1.676 m)     Physical Exam    Nursing notes and vital signs reviewed    Constitutional: Non toxic, obese appearing, no acute distress  Head: Normocephalic, Atraumatic  Eyes: EOMI  Neck: Supple  Chest: Normal work of breathing and chest excursion bilaterally  Back: No evidence of trauma or deformity  Extremities: No evidence of trauma or deformity, mild edema of the left lower extremity when compared to the right, mild tenderness of the left calf and left heel to palpation, distal neurovascular intact  Skin: Warm and dry, normal cap refill  Neuro: Alert and appropriate  Psychiatric: Normal mood and affect      Diagnostic Study Results     Labs -   No results found for this or any previous visit (from the past 12 hour(s)). Radiologic Studies -   No orders to display     CT Results  (Last 48 hours)    None        CXR Results  (Last 48 hours)    None          Medications given in the ED-  Medications - No data to display      Medical Decision Making   I am the first provider for this patient. I reviewed the vital signs, available nursing notes, past medical history, past surgical history, family history and social history. Vital Signs-Reviewed the patient's vital signs.     Pulse Oximetry Analysis - 100% on room air, not hypoxic    Records Reviewed: Nursing Notes    Provider Notes (Medical Decision Making): Esperanza Tabor is a 38 y.o. female ending for left lower extremity edema and pain. No acute abnormalities on exam and Doppler negative for DVT. Suspect may be related to overuse and being on her feet. Plan for discharge with rice therapy, primary care follow-up, return precautions. Patient understands and agrees with this plan. Procedures:  Procedures    ED Course:   8:33 PM  Updated patient on all results and plan. All questions answered. Diagnosis and Disposition     Critical Care: None    DISCHARGE NOTE:    Ivanna Ricks  results have been reviewed with her. She has been counseled regarding her diagnosis, treatment, and plan. She verbally conveys understanding and agreement of the signs, symptoms, diagnosis, treatment and prognosis and additionally agrees to follow up as discussed. She also agrees with the care-plan and conveys that all of her questions have been answered. I have also provided discharge instructions for her that include: educational information regarding their diagnosis and treatment, and list of reasons why they would want to return to the ED prior to their follow-up appointment, should her condition change. She has been provided with education for proper emergency department utilization. CLINICAL IMPRESSION:    1. Leg edema, left        PLAN:  1. D/C Home  2. Current Discharge Medication List      START taking these medications    Details   ibuprofen (MOTRIN) 600 mg tablet Take 1 Tab by mouth every six (6) hours as needed for Pain. Qty: 20 Tab, Refills: 0      cyclobenzaprine (FLEXERIL) 10 mg tablet Take 1 Tab by mouth three (3) times daily as needed for Muscle Spasm(s). Qty: 12 Tab, Refills: 0           3.    Follow-up Information     Follow up With Specialties Details Why Contact Dinah López, DO Internal Medicine Schedule an appointment as soon as possible for a visit Or Your Primary Care Physician [address] [place] [address],[address] [address]  [phone]          _______________________________      Please note that this dictation was completed with [employer], the computer voice recognition software. Quite often unanticipated grammatical, syntax, homophones, and other interpretive errors are inadvertently transcribed by the computer software. Please disregard these errors. Please excuse any errors that have escaped final proofreading.

## 2020-08-13 NOTE — DISCHARGE INSTRUCTIONS
Patient Education        Leg and Ankle Edema: Care Instructions  Your Care Instructions  Swelling in the legs, ankles, and feet is called edema. It is common after you sit or stand for a while. Long plane flights or car rides often cause swelling in the legs and feet. You may also have swelling if you have to stand for long periods of time at your job. Problems with the veins in the legs (varicose veins) and changes in hormones can also cause swelling. Sometimes the swelling in the ankles and feet is caused by a more serious problem, such as heart failure, infection, blood clots, or liver or kidney disease. Follow-up care is a key part of your treatment and safety. Be sure to make and go to all appointments, and call your doctor if you are having problems. It's also a good idea to know your test results and keep a list of the medicines you take. How can you care for yourself at home? · If your doctor gave you medicine, take it as prescribed. Call your doctor if you think you are having a problem with your medicine. · Whenever you are resting, raise your legs up. Try to keep the swollen area higher than the level of your heart. · Take breaks from standing or sitting in one position. ? Walk around to increase the blood flow in your lower legs. ? Move your feet and ankles often while you stand, or tighten and relax your leg muscles. · Wear support stockings. Put them on in the morning, before swelling gets worse. · Eat a balanced diet. Lose weight if you need to. · Limit the amount of salt (sodium) in your diet. Salt holds fluid in the body and may increase swelling. When should you call for help? [id number] anytime you think you may need emergency care. For example, call if:  · You have symptoms of a blood clot in your lung (called a pulmonary embolism). These may include:  ? Sudden chest pain. ? Trouble breathing. ? Coughing up blood.   Call your doctor now or seek immediate medical care if:  · You have signs of a blood clot, such as:  ? Pain in your calf, back of the knee, thigh, or groin. ? Redness and swelling in your leg or groin. · You have symptoms of infection, such as:  ? Increased pain, swelling, warmth, or redness. ? Red streaks or pus. ? A fever. Watch closely for changes in your health, and be sure to contact your doctor if:  · Your swelling is getting worse. · You have new or worsening pain in your legs. · You do not get better as expected. Where can you learn more? Go to http://www.gray.com/  Enter Z782 in the search box to learn more about \"Leg and Ankle Edema: Care Instructions. \"  Current as of: June 26, 2019               Content Version: 12.5  © 4790-6586 [employer]. Care instructions adapted under license by [employer] (which disclaims liability or warranty for this information). If you have questions about a medical condition or this instruction, always ask your healthcare professional. [address] any warranty or liability for your use of this information.

## 2021-03-11 ENCOUNTER — HOSPITAL ENCOUNTER (OUTPATIENT)
Dept: GENERAL RADIOLOGY | Age: 36
Discharge: HOME | End: 2021-03-11
Payer: COMMERCIAL

## 2021-03-11 ENCOUNTER — TRANSCRIBE ORDER (OUTPATIENT)
Dept: REGISTRATION | Age: 36
End: 2021-03-11

## 2021-03-11 DIAGNOSIS — M25.561 RIGHT KNEE PAIN: ICD-10-CM

## 2021-03-11 DIAGNOSIS — M25.561 RIGHT KNEE PAIN: Primary | ICD-10-CM

## 2021-03-11 DIAGNOSIS — M25.562 BILATERAL KNEE PAIN: ICD-10-CM

## 2021-03-11 DIAGNOSIS — M25.561 BILATERAL KNEE PAIN: ICD-10-CM

## 2021-03-11 PROCEDURE — 73562 X-RAY EXAM OF KNEE 3: CPT

## 2022-04-25 NOTE — PROGRESS NOTES
Problem: Pain  Goal: *Control of Pain  Outcome: Progressing Towards Goal  Goal: *PALLIATIVE CARE:  Alleviation of Pain  Outcome: Progressing Towards Goal     Problem: Patient Education: Go to Patient Education Activity  Goal: Patient/Family Education  Outcome: Progressing Towards Goal Mercedes Flap Text: The defect edges were debeveled with a #15 scalpel blade.  Given the location of the defect, shape of the defect and the proximity to free margins a Mercedes flap was deemed most appropriate.  Using a sterile surgical marker, an appropriate advancement flap was drawn incorporating the defect and placing the expected incisions within the relaxed skin tension lines where possible. The area thus outlined was incised deep to adipose tissue with a #15 scalpel blade.  The skin margins were undermined to an appropriate distance in all directions utilizing iris scissors.